# Patient Record
Sex: MALE | Race: WHITE | NOT HISPANIC OR LATINO | Employment: OTHER | URBAN - METROPOLITAN AREA
[De-identification: names, ages, dates, MRNs, and addresses within clinical notes are randomized per-mention and may not be internally consistent; named-entity substitution may affect disease eponyms.]

---

## 2017-06-30 ENCOUNTER — TRANSFERRED RECORDS (OUTPATIENT)
Dept: HEALTH INFORMATION MANAGEMENT | Facility: CLINIC | Age: 72
End: 2017-06-30

## 2017-08-07 ENCOUNTER — OFFICE VISIT (OUTPATIENT)
Dept: UROLOGY | Facility: CLINIC | Age: 72
End: 2017-08-07
Payer: COMMERCIAL

## 2017-08-07 VITALS
SYSTOLIC BLOOD PRESSURE: 128 MMHG | HEART RATE: 62 BPM | HEIGHT: 70 IN | DIASTOLIC BLOOD PRESSURE: 68 MMHG | WEIGHT: 175 LBS | BODY MASS INDEX: 25.05 KG/M2

## 2017-08-07 DIAGNOSIS — R97.20 ELEVATED PROSTATE SPECIFIC ANTIGEN (PSA): Primary | ICD-10-CM

## 2017-08-07 LAB
ALBUMIN UR-MCNC: NEGATIVE MG/DL
APPEARANCE UR: CLEAR
BILIRUB UR QL STRIP: NEGATIVE
COLOR UR AUTO: YELLOW
GLUCOSE UR STRIP-MCNC: NEGATIVE MG/DL
HGB UR QL STRIP: ABNORMAL
KETONES UR STRIP-MCNC: NEGATIVE MG/DL
LEUKOCYTE ESTERASE UR QL STRIP: NEGATIVE
NITRATE UR QL: NEGATIVE
PH UR STRIP: 7 PH (ref 5–7)
PSA SERPL-MCNC: 6.4 NG/ML (ref 0–4)
SP GR UR STRIP: 1.01 (ref 1–1.03)
URN SPEC COLLECT METH UR: ABNORMAL
UROBILINOGEN UR STRIP-ACNC: 0.2 EU/DL (ref 0.2–1)

## 2017-08-07 PROCEDURE — 99214 OFFICE O/P EST MOD 30 MIN: CPT | Performed by: UROLOGY

## 2017-08-07 PROCEDURE — 36415 COLL VENOUS BLD VENIPUNCTURE: CPT | Performed by: UROLOGY

## 2017-08-07 PROCEDURE — 81003 URINALYSIS AUTO W/O SCOPE: CPT | Performed by: UROLOGY

## 2017-08-07 PROCEDURE — 84153 ASSAY OF PSA TOTAL: CPT | Performed by: UROLOGY

## 2017-08-07 RX ORDER — ASPIRIN 81 MG/1
81 TABLET ORAL EVERY OTHER DAY
COMMUNITY
Start: 2015-03-03

## 2017-08-07 RX ORDER — EZETIMIBE 10 MG/1
10 TABLET ORAL DAILY
COMMUNITY
Start: 2017-01-03

## 2017-08-07 RX ORDER — NITROGLYCERIN 0.4 MG/1
0.4 TABLET SUBLINGUAL
COMMUNITY
Start: 2016-03-08

## 2017-08-07 RX ORDER — TRIAMCINOLONE ACETONIDE 55 UG/1
1 SPRAY, METERED NASAL DAILY PRN
COMMUNITY
Start: 2015-03-03

## 2017-08-07 RX ORDER — DIPHENOXYLATE HYDROCHLORIDE AND ATROPINE SULFATE 2.5; .025 MG/1; MG/1
1 TABLET ORAL DAILY
COMMUNITY

## 2017-08-07 RX ORDER — PITAVASTATIN CALCIUM 2.09 MG/1
2 TABLET, FILM COATED ORAL DAILY
COMMUNITY
Start: 2017-01-03

## 2017-08-07 RX ORDER — METOPROLOL TARTRATE 25 MG/1
12.5 TABLET, FILM COATED ORAL 2 TIMES DAILY
COMMUNITY
Start: 2017-01-03

## 2017-08-07 RX ORDER — PHENYTOIN SODIUM 100 MG/1
200 CAPSULE, EXTENDED RELEASE ORAL 2 TIMES DAILY
COMMUNITY

## 2017-08-07 RX ORDER — FAMOTIDINE 20 MG/1
20 TABLET, FILM COATED ORAL 2 TIMES DAILY
COMMUNITY
Start: 2011-11-22

## 2017-08-07 RX ORDER — PHENOL 1.4 %
600 AEROSOL, SPRAY (ML) MUCOUS MEMBRANE DAILY
COMMUNITY
Start: 2015-09-01

## 2017-08-07 RX ORDER — UBIDECARENONE 200 MG
200 CAPSULE ORAL DAILY
COMMUNITY
Start: 2016-03-08

## 2017-08-07 ASSESSMENT — PAIN SCALES - GENERAL: PAINLEVEL: NO PAIN (0)

## 2017-08-07 NOTE — MR AVS SNAPSHOT
"              After Visit Summary   8/7/2017    Kimani Juarez    MRN: [id number]           Patient Information     Date Of Birth          1945        Visit Information        Provider Department      8/7/2017 10:20 AM Kyle Wright MD [facility] [place]        Today's Diagnoses     Elevated prostate specific antigen (PSA)    -  1       Follow-ups after your visit        Follow-up notes from your care team     Return in about 1 year (around 8/7/2018) for PSA, Physical Exam, AUA score and bladder scan.      Who to contact     If you have questions or need follow up information about today's clinic visit or your schedule please contact [facility] [place] directly at [phone].  Normal or non-critical lab and imaging results will be communicated to you by Pharmaxishart, letter or phone within 4 business days after the clinic has received the results. If you do not hear from us within 7 days, please contact the clinic through Pharmaxishart or phone. If you have a critical or abnormal lab result, we will notify you by phone as soon as possible.  Submit refill requests through Escapia or call your pharmacy and they will forward the refill request to us. Please allow 3 business days for your refill to be completed.          Additional Information About Your Visit        PharmaxisharSocialinus Information     Escapia lets you send messages to your doctor, view your test results, renew your prescriptions, schedule appointments and more. To sign up, go to [email or web address] . Click on \"Log in\" on the left side of the screen, which will take you to the Welcome page. Then click on \"Sign up Now\" on the right side of the page.     You will be asked to enter the access code listed below, as well as some personal information. Please follow the directions to create your username and password.     Your access code is: [email or web address]  Expires: 11/5/2017 10:51 AM     Your access " "code will  in 90 days. If you need help or a new code, please call your [place] clinic or [phone].        Care EveryWhere ID     This is your Care EveryWhere ID. This could be used by other organizations to access your [facility] medical records  [id number]        Your Vitals Were     Pulse Height BMI (Body Mass Index)             62 1.778 m (5' 10\") 25.11 kg/m2          Blood Pressure from Last 3 Encounters:   17 128/68    Weight from Last 3 Encounters:   17 79.4 kg (175 lb)              We Performed the Following     PSA Diag Urologic Phys     UA without Microscopic        Primary Care Provider Office Phone # Fax #    Mike Monaco MD, [phone]       [facility] [address] [address]        Equal Access to Services     JOLIE JEFFRIES : Hadii aad ku hadasho Soomaali, waaxda luqadaha, qaybta kaalmada adeegyada, waxay idiin hayaan adebebeto kharakenia maldonadon . So [facility] [phone].    ATENCIÓN: Si habla español, tiene a egan disposición servicios gratuitos de asistencia lingüística. Nory al [phone].    We comply with applicable federal civil rights laws and [place] laws. We do not discriminate on the basis of race, color, national origin, age, disability sex, sexual orientation or gender identity.            Thank you!     Thank you for choosing [facility] [place]  for your care. Our goal is always to provide you with excellent care. Hearing back from our patients is one way we can continue to improve our services. Please take a few minutes to complete the written survey that you may receive in the mail after your visit with us. Thank you!             Your Updated Medication List - Protect others around you: Learn how to safely use, store and throw away your medicines at www.disposemymeds.org.          This list is accurate as of: 17 10:51 AM.  Always use your most recent med list.                   Brand Name " Dispense Instructions for use Diagnosis    Aspartame Powd      Start with 1 dose once daily.  Increase to 2 to 3 doses daily as tolerated.  For additional cholesterol lowering.        aspirin EC 81 MG EC tablet      Take 81 mg by mouth        calcium carbonate 600 MG tablet   Generic drug:  calcium carbonate      Take 1,500 mg by mouth        coenzyme Q-10 200 MG Caps      Take 1 capsule (200 mg) twice daily for muscle protection.        DILANTIN 100 MG CR capsule   Generic drug:  phenytoin           ezetimibe 10 MG tablet    ZETIA     Take 10 mg by mouth        famotidine 20 MG tablet    PEPCID     Take 20 mg by mouth        FIBER PO           metoprolol 25 MG tablet    LOPRESSOR     Take 12.5 mg by mouth        MULTI-VITAMINS Tabs           nitroGLYcerin 0.4 MG sublingual tablet    NITROSTAT     Place 0.4 mg under the tongue        pitavastatin 2 MG Tabs tablet     LIVALO     Take 2 mg by mouth        triamcinolone 55 MCG/ACT Inhaler    NASACORT     1 spray

## 2017-08-07 NOTE — LETTER
8/7/2017       RE: Kimani Juarez  [address]  [place] [address]     Dear Colleague,    Thank you for referring your patient, Kimani Juarez, to the [facility] [place] at [facility]. Please see a copy of my visit note below.    History: It is a great pleasure to see this very pleasant 72-year-old gentleman for the first time and just over 2 years.  He has a history of an elevated PSA, and biopsies of the prostate were done on one occasion which showed one biopsy showed a very small amount of atypia but no other remarkable features on any of the biopsies.  PSA at that time was 4.3  and subsequently PSA in March 2014 was 4.7, 6 months later it was 5.4.  I have not seen him for 2-1/2 years but today the PSA is 6.4.  This represents a slow PSA velocity and would be considered very close to the upper limit of normal for a man of his age.  He is voiding well although he does have some moderate nocturia is drinking a large amount of caffeine.  No evidence of any other significant urinary symptoms.  General health otherwise stable  Past Medical History:   Diagnosis Date     Mumps      Social History     Social History     Marital status:      Spouse name: N/A     Number of children: N/A     Years of education: N/A     Social History Main Topics     Smoking status: Never Smoker     Smokeless tobacco: Never Used     Alcohol use No     Drug use: No     Sexual activity: Not Asked     Other Topics Concern     None     Social History Narrative     None     Past Surgical History:   Procedure Laterality Date     CYSTOSCOPY       VASECTOMY       Family History   Problem Relation Age of Onset     HEART DISEASE Father      Current Outpatient Prescriptions:      aspirin EC 81 MG EC tablet, Take 81 mg by mouth, Disp: , Rfl:      calcium carbonate (CALCIUM CARBONATE) 600 MG tablet, Take 1,500 mg by mouth, Disp: , Rfl:      coenzyme Q-10 200 MG CAPS, Take  "1 capsule (200 mg) twice daily for muscle protection., Disp: , Rfl:      phenytoin (DILANTIN) 100 MG CR capsule, , Disp: , Rfl:      ezetimibe (ZETIA) 10 MG tablet, Take 10 mg by mouth, Disp: , Rfl:      famotidine (PEPCID) 20 MG tablet, Take 20 mg by mouth, Disp: , Rfl:      FIBER PO, , Disp: , Rfl:      metoprolol (LOPRESSOR) 25 MG tablet, Take 12.5 mg by mouth, Disp: , Rfl:      Multiple Vitamin (MULTI-VITAMINS) TABS, , Disp: , Rfl:      nitroGLYcerin (NITROSTAT) 0.4 MG sublingual tablet, Place 0.4 mg under the tongue, Disp: , Rfl:      pitavastatin ( LIVALO) 2 MG TABS tablet, Take 2 mg by mouth, Disp: , Rfl:      triamcinolone (NASACORT) 55 MCG/ACT Inhaler, 1 spray, Disp: , Rfl:      Aspartame POWD, Start with 1 dose once daily.  Increase to 2 to 3 doses daily as tolerated.  For additional cholesterol lowering., Disp: , Rfl:     10 point ROS of systems including Constitutional, Eyes, Respiratory, Cardiovascular, Gastroenterology, Genitourinary, Integumentary, Muscularskeletal, Psychiatric were all negative except for pertinent positives noted in my HPI.    Examination:   /68 (BP Location: Left arm, Patient Position: Sitting, Cuff Size: Adult Regular)  Pulse 62  Ht 1.778 m (5' 10\")  Wt 79.4 kg (175 lb)  BMI 25.11 kg/m2  General Impression: Very pleasant gentleman in no acute distress, well-oriented to time place and person  Mental Status: Normal.  HEENT.  There is no evidence of jaundice and the mucous membranes are normal  Skin: The skin is normal to examination  Respiratory System: The respiratory cycle is normal  Lymph Nodes: Not examined  Back/Flank Tenderness: There is no flank tenderness  Cardiovascular System: Not examined  Abdominal Examination: Unremarkable nonobese abdomen  Extremities: No significant peripheral edema  Genitial: Not examined  Rectal Examination: Good sphincter tone, normal perianal sensation.  Smooth rectal mucosa without hemorrhoids or fissures.  Smooth soft moderately " enlarged prostate without evidence of tenderness, bogginess or nodules  Neurologic System: There are no focal abnormal clinical neurological signs in the central, or peripheral nervous systems    Impression: I had a careful discussion with the patient and his wife about the situation.  The PSA velocity is very slow, the prostate gland is moderately enlarged but I do not feel any sinister areas within it.  He does have 1 previous biopsy with a small volume of atypia from 2013.  I had a careful discussion with him today about PSA and isvisiting, we also had a discussion about prostate cancer, its prevalence, its relationship between the incidence and mortality of prostate cancer and some of the controversy that surrounds his management.  He does not have a family history of prostate cancer.  We did have a discussion about whether we should consider a T3 MRI of the prostate; we have however decided to repeat the PSA in 1 year along with clinical examination.  At that point, if there is a significant increase in PSA velocity we will plan to do a T3 MRI; if the PSA is stable I will likely recommend he can follow up with his personal physician to have a PSA checked on an annual basis and then see me again should there be significant rise in the PSA.  We also had a discussion today about caffeine and its peritoneal defect on the bladder; he is drinking 10 or 12 cups of coffee a day!  However he does not seem to be bothered about getting up 2-3 times at night.  If this is however a bothersome issue to him I would recommend he reduce his caffeine intake  I did carefully discussed the entire situation with the patient and his wife today.  I answered all questions.    Plan: I will see him in one year for PSA, bladder scan, symptom score on examination    Time: 25 minutes.  Greater than 50% was spent in discussion, consultation with careful discussion about PSA and its nature and issues related to prostate cancer and also to  "caffeine and its effects on the bladder and the prostate    \"This dictation was performed with voice recognition software and may contain errors,  omissions and inadvertent word substitution.\"  Kyle Wright MD    "

## 2017-08-07 NOTE — PROGRESS NOTES
History: It is a great pleasure to see this very pleasant 72-year-old gentleman for the first time and just over 2 years.  He has a history of an elevated PSA, and biopsies of the prostate were done on one occasion which showed one biopsy showed a very small amount of atypia but no other remarkable features on any of the biopsies.  PSA at that time was 4.3  and subsequently PSA in March 2014 was 4.7, 6 months later it was 5.4.  I have not seen him for 2-1/2 years but today the PSA is 6.4.  This represents a slow PSA velocity and would be considered very close to the upper limit of normal for a man of his age.  He is voiding well although he does have some moderate nocturia is drinking a large amount of caffeine.  No evidence of any other significant urinary symptoms.  General health otherwise stable    Past Medical History:   Diagnosis Date     Mumps        Social History     Social History     Marital status:      Spouse name: N/A     Number of children: N/A     Years of education: N/A     Social History Main Topics     Smoking status: Never Smoker     Smokeless tobacco: Never Used     Alcohol use No     Drug use: No     Sexual activity: Not Asked     Other Topics Concern     None     Social History Narrative     None       Past Surgical History:   Procedure Laterality Date     CYSTOSCOPY       VASECTOMY         Family History   Problem Relation Age of Onset     HEART DISEASE Father          Current Outpatient Prescriptions:      aspirin EC 81 MG EC tablet, Take 81 mg by mouth, Disp: , Rfl:      calcium carbonate (CALCIUM CARBONATE) 600 MG tablet, Take 1,500 mg by mouth, Disp: , Rfl:      coenzyme Q-10 200 MG CAPS, Take 1 capsule (200 mg) twice daily for muscle protection., Disp: , Rfl:      phenytoin (DILANTIN) 100 MG CR capsule, , Disp: , Rfl:      ezetimibe (ZETIA) 10 MG tablet, Take 10 mg by mouth, Disp: , Rfl:      famotidine (PEPCID) 20 MG tablet, Take 20 mg by mouth, Disp: , Rfl:      FIBER PO, ,  "Disp: , Rfl:      metoprolol (LOPRESSOR) 25 MG tablet, Take 12.5 mg by mouth, Disp: , Rfl:      Multiple Vitamin (MULTI-VITAMINS) TABS, , Disp: , Rfl:      nitroGLYcerin (NITROSTAT) 0.4 MG sublingual tablet, Place 0.4 mg under the tongue, Disp: , Rfl:      pitavastatin ( LIVALO) 2 MG TABS tablet, Take 2 mg by mouth, Disp: , Rfl:      triamcinolone (NASACORT) 55 MCG/ACT Inhaler, 1 spray, Disp: , Rfl:      Aspartame POWD, Start with 1 dose once daily.  Increase to 2 to 3 doses daily as tolerated.  For additional cholesterol lowering., Disp: , Rfl:     10 point ROS of systems including Constitutional, Eyes, Respiratory, Cardiovascular, Gastroenterology, Genitourinary, Integumentary, Muscularskeletal, Psychiatric were all negative except for pertinent positives noted in my HPI.    Examination:   /68 (BP Location: Left arm, Patient Position: Sitting, Cuff Size: Adult Regular)  Pulse 62  Ht 1.778 m (5' 10\")  Wt 79.4 kg (175 lb)  BMI 25.11 kg/m2  General Impression: Very pleasant gentleman in no acute distress, well-oriented to time place and person  Mental Status: Normal.  HEENT.  There is no evidence of jaundice and the mucous membranes are normal  Skin: The skin is normal to examination  Respiratory System: The respiratory cycle is normal  Lymph Nodes: Not examined  Back/Flank Tenderness: There is no flank tenderness  Cardiovascular System: Not examined  Abdominal Examination: Unremarkable nonobese abdomen  Extremities: No significant peripheral edema  Genitial: Not examined  Rectal Examination: Good sphincter tone, normal perianal sensation.  Smooth rectal mucosa without hemorrhoids or fissures.  Smooth soft moderately enlarged prostate without evidence of tenderness, bogginess or nodules  Neurologic System: There are no focal abnormal clinical neurological signs in the central, or peripheral nervous systems    Impression: I had a careful discussion with the patient and his wife about the situation.  The PSA " "velocity is very slow, the prostate gland is moderately enlarged but I do not feel any sinister areas within it.  He does have 1 previous biopsy with a small volume of atypia from 2013.  I had a careful discussion with him today about PSA and isvisiting, we also had a discussion about prostate cancer, its prevalence, its relationship between the incidence and mortality of prostate cancer and some of the controversy that surrounds his management.  He does not have a family history of prostate cancer.  We did have a discussion about whether we should consider a T3 MRI of the prostate; we have however decided to repeat the PSA in 1 year along with clinical examination.  At that point, if there is a significant increase in PSA velocity we will plan to do a T3 MRI; if the PSA is stable I will likely recommend he can follow up with his personal physician to have a PSA checked on an annual basis and then see me again should there be significant rise in the PSA.  We also had a discussion today about caffeine and its peritoneal defect on the bladder; he is drinking 10 or 12 cups of coffee a day!  However he does not seem to be bothered about getting up 2-3 times at night.  If this is however a bothersome issue to him I would recommend he reduce his caffeine intake  I did carefully discussed the entire situation with the patient and his wife today.  I answered all questions.    Plan: I will see him in one year for PSA, bladder scan, symptom score on examination    Time: 25 minutes.  Greater than 50% was spent in discussion, consultation with careful discussion about PSA and its nature and issues related to prostate cancer and also to caffeine and its effects on the bladder and the prostate    \"This dictation was performed with voice recognition software and may contain errors,  omissions and inadvertent word substitution.\"      "

## 2018-08-03 DIAGNOSIS — R97.20 ELEVATED PROSTATE SPECIFIC ANTIGEN (PSA): Primary | ICD-10-CM

## 2018-08-03 PROCEDURE — 99214 OFFICE O/P EST MOD 30 MIN: CPT | Mod: 25 | Performed by: UROLOGY

## 2018-08-03 PROCEDURE — 51798 US URINE CAPACITY MEASURE: CPT | Performed by: UROLOGY

## 2018-08-09 ENCOUNTER — OFFICE VISIT (OUTPATIENT)
Dept: UROLOGY | Facility: CLINIC | Age: 73
End: 2018-08-09
Payer: COMMERCIAL

## 2018-08-09 VITALS
OXYGEN SATURATION: 98 % | HEIGHT: 70 IN | HEART RATE: 69 BPM | WEIGHT: 175 LBS | BODY MASS INDEX: 25.05 KG/M2 | DIASTOLIC BLOOD PRESSURE: 70 MMHG | SYSTOLIC BLOOD PRESSURE: 124 MMHG

## 2018-08-09 DIAGNOSIS — R97.20 ELEVATED PROSTATE SPECIFIC ANTIGEN (PSA): ICD-10-CM

## 2018-08-09 LAB — PSA SERPL-MCNC: 7.5 NG/ML (ref 0–4)

## 2018-08-09 PROCEDURE — 84153 ASSAY OF PSA TOTAL: CPT | Performed by: UROLOGY

## 2018-08-09 PROCEDURE — 99214 OFFICE O/P EST MOD 30 MIN: CPT | Performed by: UROLOGY

## 2018-08-09 PROCEDURE — 36415 COLL VENOUS BLD VENIPUNCTURE: CPT | Performed by: UROLOGY

## 2018-08-09 ASSESSMENT — PAIN SCALES - GENERAL: PAINLEVEL: NO PAIN (0)

## 2018-08-09 NOTE — LETTER
8/9/2018       RE: Berlin Cordoba  [address]  [place] [address]     Dear Colleague,    Thank you for referring your patient, Berlin Cordoba, to the [facility] [place] at [facility]. Please see a copy of my visit note below.    History: This is a great pleasure to see this very pleasant 73-year-old gentleman in follow-up consultation today.  He does have a history of an elevated PSA and in the past has had biopsies of the prostate which only showed a small amount of atypia without evidence of malignancy.  In addition he has been troubled by marked frequency and nocturia, with a symptom score today of 18.  Postvoid residual is only 17 cc.  We does drink a lot of caffeine, about 10 cups of coffee a day!  And occasionally also drinks sodas.  Results for BERLIN CORDOBA (MRN [id number]) as of 8/9/2018 14:12   Ref. Range 8/7/2017 10:01 8/9/2018 13:39   PSA Diag Urologic Phys Latest Ref Range: 0.00 - 4.00 ng/mL 6.40 (H) 7.50 (H)   There does seem to be a continuing slow rise in the PSA today it is 7.5 up from 6.4 last year.  He has not observed blood in the urine, there is no history of urinary tract infection.  His general health is otherwise stable  Past Medical History:   Diagnosis Date     Mumps        Social History     Social History     Marital status:      Spouse name: N/A     Number of children: N/A     Years of education: N/A     Social History Main Topics     Smoking status: Never Smoker     Smokeless tobacco: Never Used     Alcohol use No     Drug use: No     Sexual activity: Not Asked     Other Topics Concern     None     Social History Narrative       Past Surgical History:   Procedure Laterality Date     CYSTOSCOPY       VASECTOMY         Family History   Problem Relation Age of Onset     HEART DISEASE Father          Current Outpatient Prescriptions:      aspirin EC 81 MG EC tablet, Take 81 mg by mouth, Disp: , Rfl:       "calcium carbonate (CALCIUM CARBONATE) 600 MG tablet, Take 1,500 mg by mouth, Disp: , Rfl:      coenzyme Q-10 200 MG CAPS, Take 1 capsule (200 mg) twice daily for muscle protection., Disp: , Rfl:      ezetimibe (ZETIA) 10 MG tablet, Take 10 mg by mouth, Disp: , Rfl:      famotidine (PEPCID) 20 MG tablet, Take 20 mg by mouth, Disp: , Rfl:      FIBER PO, , Disp: , Rfl:      metoprolol (LOPRESSOR) 25 MG tablet, Take 12.5 mg by mouth, Disp: , Rfl:      Multiple Vitamin (MULTI-VITAMINS) TABS, , Disp: , Rfl:      phenytoin (DILANTIN) 100 MG CR capsule, , Disp: , Rfl:      pitavastatin ( LIVALO) 2 MG TABS tablet, Take 2 mg by mouth, Disp: , Rfl:      triamcinolone (NASACORT) 55 MCG/ACT Inhaler, 1 spray, Disp: , Rfl:      Aspartame POWD, Start with 1 dose once daily.  Increase to 2 to 3 doses daily as tolerated.  For additional cholesterol lowering., Disp: , Rfl:      nitroGLYcerin (NITROSTAT) 0.4 MG sublingual tablet, Place 0.4 mg under the tongue, Disp: , Rfl:     10 point ROS of systems including Constitutional, Eyes, Respiratory, Cardiovascular, Gastroenterology, Genitourinary, Integumentary, Muscularskeletal, Psychiatric were all negative except for pertinent positives noted in my HPI.    Examination:   /70 (BP Location: Left arm, Patient Position: Sitting, Cuff Size: Adult Regular)  Pulse 69  Ht 1.778 m (5' 10\")  Wt 79.4 kg (175 lb)  SpO2 98%  BMI 25.11 kg/m2  General Impression: Very pleasant gentleman in no acute distress, well oriented in time place and person  Mental Status: Normal.  HEENT.  There is no clinical evidence of jaundice and the mucous membranes are normal  Skin: Skin is normal to examination  Respiratory System: The respiratory cycle is normal  Lymph Nodes: Not examined  Back/Flank Tenderness: There is no flank tenderness  Cardiovascular System: Not examined  Abdominal Examination: Not examined  Extremities: There is no significant peripheral edema  Genitial: Not examined  Rectal " Examination: Good sphincter tone, normal perianal sensation.  Smooth rectal mucosa without hemorrhoids or fissures.  Smooth soft and significantly enlarged prostate with firm and rubbery consistency, without evidence of tenderness or bogginess.  Seminal vesicles.  Not palpable.  Perineum is otherwise normal to examination.    Neurologic System: There are no focal abnormal clinical neurological signs in the central, or peripheral nervous systems    Impression: There are 2 significant issues  1.  He is troubled by nocturia and frequency.  I think this is almost certainly attributable to high caffeine intake, I doubt there is much evidence of obstruction as the postvoid residual is only 17 cc and I recommended he try and eliminate caffeine in all forms to see if that is beneficial effect on his symptoms.  2.  The PSA has continued to slowly rise and is now 7.5 which is still elevated for amount of 71 the prostate gland itself does feel quite firm and rubbery but this in itself does not feel sinister.  However given this gradual rise in the PSA I think at this point we should arrange for a T3 MRI of the prostate for better evaluation and I told the patient that it is possible if suspicious areas are identified in the prostate we may need to consider him for another biopsy of the prostate that.  I did explain to him about prostate cancer, about the relevance of PSA which is an essentially very nonspecific test to prostate cancer, and some of the controversy surrounding the current management of prostate at the present time.  Most notably we discussed the relationship between the actual incidence of prostate cancer and his mortality and some of the different therapeutic options that may be considered should prostate cancer be identified including active surveillance, radical prostatectomy radiation therapy cryotherapy and hormonal treatment  I did discuss this procedure with him including the potential side effects which  "would include hematuria for a short time, blood in the stool for a short time, hematospermia for about a month and the possibility of sepsis.  I did carefully discuss the entire situation with the patient in detail today.      Plan: T3 MRI of the prostate and see me after  Caffeine free diet    Time: 25 minutes with greater than 50% spent in discussion and consultation due to number of significant urologic issues and discussions    \"This dictation was performed with voice recognition software and may contain errors,  omissions and inadvertent word substitution.\"    Again, thank you for allowing me to participate in the care of your patient.      Sincerely,    Kyle Wright MD      "

## 2018-08-09 NOTE — MR AVS SNAPSHOT
After Visit Summary   8/9/2018    Kimani Juarez    MRN: [id number]           Patient Information     Date Of Birth          1945        Visit Information        Provider Department      8/9/2018 1:40 PM Kyle Wright MD [facility] [place]        Today's Diagnoses     Elevated prostate specific antigen (PSA)           Follow-ups after your visit        Follow-up notes from your care team     Return for T3 MRI of prostate at , SEE ME AFTER.      Your next 10 appointments already scheduled     Aug 18, 2018  1:45 PM CDT   MR PROSTATE  WO & W CONTRAST with [id number]   [facility] ([facility])    [address] [address] [phone]           Take your medicines as usual, unless your doctor tells you not to. Bring a list of your current medicines to your exam (including vitamins, minerals and over-the-counter drugs).  You may or may not receive intravenous (IV) contrast for this exam pending the discretion of the Radiologist.  You do not need to do anything special to prepare.  The MRI machine uses a strong magnet. Please wear clothes without metal (snaps, zippers). A sweatsuit works well, or we may give you a hospital gown.  Please remove any body piercings and hair extensions before you arrive. You will also remove watches, jewelry, hairpins, wallets, dentures, partial dental plates and hearing aids. You may wear contact lenses, and you may be able to wear your rings. We have a safe place to keep your personal items, but it is safer to leave them at home.  **IMPORTANT** THE INSTRUCTIONS BELOW ARE ONLY FOR THOSE PATIENTS WHO HAVE BEEN PRESCRIBED SEDATION OR GENERAL ANESTHESIA DURING THEIR MRI PROCEDURE:  IF YOUR DOCTOR PRESCRIBED ORAL SEDATION (take medicine to help you relax during your exam):   You must get the medicine from your doctor (oral medication) before you arrive. Bring the  medicine to the exam. Do not take it at home. You ll be told when to take it upon arriving for your exam.   Arrive one hour early. Bring someone who can take you home after the test. Your medicine will make you sleepy. After the exam, you may not drive, take a bus or take a taxi by yourself.  IF YOUR DOCTOR PRESCRIBED IV SEDATION:   Arrive one hour early. Bring someone who can take you home after the test. Your medicine will make you sleepy. After the exam, you may not drive, take a bus or take a taxi by yourself.   No eating 6 hours before your exam. You may have clear liquids up until 4 hours before your exam. (Clear liquids include water, clear tea, black coffee and fruit juice without pulp.)  IF YOUR DOCTOR PRESCRIBED ANESTHESIA (be asleep for your exam):   Arrive 1 1/2 hours early. Bring someone who can take you home after the test. You may not drive, take a bus or take a taxi by yourself.   No eating 8 hours before your exam. You may have clear liquids up until 4 hours before your exam. (Clear liquids include water, clear tea, black coffee and fruit juice without pulp.)   You will spend four to five hours in the recovery room.  Please call the Imaging Department at your exam site with any questions.            Sep 10, 2018  1:00 PM CDT   Return Visit with Kyle Wright MD   [facility] ([facility])    [address]  [address]  [facility] [address]   [phone]              Future tests that were ordered for you today     Open Future Orders        Priority Expected Expires Ordered    MR Prostate wo & w Conrast Routine  8/9/2019 8/9/2018            Who to contact     If you have questions or need follow up information about today's clinic visit or your schedule please contact [facility] [place] directly at [phone].  Normal or non-critical lab and imaging results will be communicated to you by MyChart, letter  "or phone within 4 business days after the clinic has received the results. If you do not hear from us within 7 days, please contact the clinic through [employer] or phone. If you have a critical or abnormal lab result, we will notify you by phone as soon as possible.  Submit refill requests through [employer] or call your pharmacy and they will forward the refill request to us. Please allow 3 business days for your refill to be completed.          Additional Information About Your Visit        [employer] Information     [employer] lets you send messages to your doctor, view your test results, renew your prescriptions, schedule appointments and more. To sign up, go to [email or web address] . Click on \"Log in\" on the left side of the screen, which will take you to the Welcome page. Then click on \"Sign up Now\" on the right side of the page.     You will be asked to enter the access code listed below, as well as some personal information. Please follow the directions to create your username and password.     Your access code is: [email or web address]  Expires: 2018  2:34 PM     Your access code will  in 90 days. If you need help or a new code, please call your [facility] clinic or [phone].        Care EveryWhere ID     This is your Care EveryWhere ID. This could be used by other organizations to access your [facility] medical records  [id number]        Your Vitals Were     Pulse Height Pulse Oximetry BMI (Body Mass Index)          69 1.778 m (5' 10\") 98% 25.11 kg/m2         Blood Pressure from Last 3 Encounters:   18 124/70   17 128/68    Weight from Last 3 Encounters:   18 79.4 kg (175 lb)   17 79.4 kg (175 lb)              We Performed the Following     PSA Diag Urologic Phys        Primary Care Provider Office Phone # Fax #    Miek Monaco MD, [phone]       [facility] [address] [place] [address]        Equal Access to Services     NEY JEFFRIES AH: Hadii aad " flory Garcia, waaxda luqadaha, qaybta kaalmada negrito, crystal marlyin hayaan lowbebeto barbosa lanicolegalindo cruz. So [facility] [phone].    ATENCIÓN: Si habla suniañol, tiene a egan disposición servicios gratuitos de asistencia lingüística. Nory al [phone].    We comply with applicable federal civil rights laws and [place] laws. We do not discriminate on the basis of race, color, national origin, age, disability, sex, sexual orientation, or gender identity.            Thank you!     Thank you for choosing [facility] [place]  for your care. Our goal is always to provide you with excellent care. Hearing back from our patients is one way we can continue to improve our services. Please take a few minutes to complete the written survey that you may receive in the mail after your visit with us. Thank you!             Your Updated Medication List - Protect others around you: Learn how to safely use, store and throw away your medicines at www.disposemymeds.org.          This list is accurate as of 8/9/18  2:34 PM.  Always use your most recent med list.                   Brand Name Dispense Instructions for use Diagnosis    Aspartame Powd      Start with 1 dose once daily.  Increase to 2 to 3 doses daily as tolerated.  For additional cholesterol lowering.        aspirin 81 MG EC tablet      Take 81 mg by mouth        calcium carbonate 600 MG tablet   Generic drug:  calcium carbonate      Take 1,500 mg by mouth        coenzyme Q-10 200 MG Caps      Take 1 capsule (200 mg) twice daily for muscle protection.        DILANTIN 100 MG CR capsule   Generic drug:  phenytoin           ezetimibe 10 MG tablet    ZETIA     Take 10 mg by mouth        famotidine 20 MG tablet    PEPCID     Take 20 mg by mouth        FIBER PO           metoprolol tartrate 25 MG tablet    LOPRESSOR     Take 12.5 mg by mouth        MULTI-VITAMINS Tabs           nitroGLYcerin 0.4 MG sublingual tablet    NITROSTAT     Place 0.4 mg  under the tongue        pitavastatin 2 MG Tabs tablet     LIVALO     Take 2 mg by mouth        triamcinolone 55 MCG/ACT Inhaler    NASACORT     1 spray

## 2018-08-09 NOTE — PROGRESS NOTES
History: This is a great pleasure to see this very pleasant 73-year-old gentleman in follow-up consultation today.  He does have a history of an elevated PSA and in the past has had biopsies of the prostate which only showed a small amount of atypia without evidence of malignancy.  In addition he has been troubled by marked frequency and nocturia, with a symptom score today of 18.  Postvoid residual is only 17 cc.  We does drink a lot of caffeine, about 10 cups of coffee a day!  And occasionally also drinks sodas.  Results for BERLIN CORDOBA (MRN [id number]) as of 8/9/2018 14:12   Ref. Range 8/7/2017 10:01 8/9/2018 13:39   PSA Diag Urologic Phys Latest Ref Range: 0.00 - 4.00 ng/mL 6.40 (H) 7.50 (H)   There does seem to be a continuing slow rise in the PSA today it is 7.5 up from 6.4 last year.  He has not observed blood in the urine, there is no history of urinary tract infection.  His general health is otherwise stable  Past Medical History:   Diagnosis Date     Mumps        Social History     Social History     Marital status:      Spouse name: N/A     Number of children: N/A     Years of education: N/A     Social History Main Topics     Smoking status: Never Smoker     Smokeless tobacco: Never Used     Alcohol use No     Drug use: No     Sexual activity: Not Asked     Other Topics Concern     None     Social History Narrative       Past Surgical History:   Procedure Laterality Date     CYSTOSCOPY       VASECTOMY         Family History   Problem Relation Age of Onset     HEART DISEASE Father          Current Outpatient Prescriptions:      aspirin EC 81 MG EC tablet, Take 81 mg by mouth, Disp: , Rfl:      calcium carbonate (CALCIUM CARBONATE) 600 MG tablet, Take 1,500 mg by mouth, Disp: , Rfl:      coenzyme Q-10 200 MG CAPS, Take 1 capsule (200 mg) twice daily for muscle protection., Disp: , Rfl:      ezetimibe (ZETIA) 10 MG tablet, Take 10 mg by mouth, Disp: , Rfl:      famotidine (PEPCID) 20 MG tablet, Take  "20 mg by mouth, Disp: , Rfl:      FIBER PO, , Disp: , Rfl:      metoprolol (LOPRESSOR) 25 MG tablet, Take 12.5 mg by mouth, Disp: , Rfl:      Multiple Vitamin (MULTI-VITAMINS) TABS, , Disp: , Rfl:      phenytoin (DILANTIN) 100 MG CR capsule, , Disp: , Rfl:      pitavastatin ( LIVALO) 2 MG TABS tablet, Take 2 mg by mouth, Disp: , Rfl:      triamcinolone (NASACORT) 55 MCG/ACT Inhaler, 1 spray, Disp: , Rfl:      Aspartame POWD, Start with 1 dose once daily.  Increase to 2 to 3 doses daily as tolerated.  For additional cholesterol lowering., Disp: , Rfl:      nitroGLYcerin (NITROSTAT) 0.4 MG sublingual tablet, Place 0.4 mg under the tongue, Disp: , Rfl:     10 point ROS of systems including Constitutional, Eyes, Respiratory, Cardiovascular, Gastroenterology, Genitourinary, Integumentary, Muscularskeletal, Psychiatric were all negative except for pertinent positives noted in my HPI.    Examination:   /70 (BP Location: Left arm, Patient Position: Sitting, Cuff Size: Adult Regular)  Pulse 69  Ht 1.778 m (5' 10\")  Wt 79.4 kg (175 lb)  SpO2 98%  BMI 25.11 kg/m2  General Impression: Very pleasant gentleman in no acute distress, well oriented in time place and person  Mental Status: Normal.  HEENT.  There is no clinical evidence of jaundice and the mucous membranes are normal  Skin: Skin is normal to examination  Respiratory System: The respiratory cycle is normal  Lymph Nodes: Not examined  Back/Flank Tenderness: There is no flank tenderness  Cardiovascular System: Not examined  Abdominal Examination: Not examined  Extremities: There is no significant peripheral edema  Genitial: Not examined  Rectal Examination: Good sphincter tone, normal perianal sensation.  Smooth rectal mucosa without hemorrhoids or fissures.  Smooth soft and significantly enlarged prostate with firm and rubbery consistency, without evidence of tenderness or bogginess.  Seminal vesicles.  Not palpable.  Perineum is otherwise normal to " examination.    Neurologic System: There are no focal abnormal clinical neurological signs in the central, or peripheral nervous systems    Impression: There are 2 significant issues  1.  He is troubled by nocturia and frequency.  I think this is almost certainly attributable to high caffeine intake, I doubt there is much evidence of obstruction as the postvoid residual is only 17 cc and I recommended he try and eliminate caffeine in all forms to see if that is beneficial effect on his symptoms.  2.  The PSA has continued to slowly rise and is now 7.5 which is still elevated for amount of 73 the prostate gland itself does feel quite firm and rubbery but this in itself does not feel sinister.  However given this gradual rise in the PSA I think at this point we should arrange for a T3 MRI of the prostate for better evaluation and I told the patient that it is possible if suspicious areas are identified in the prostate we may need to consider him for another biopsy of the prostate that.  I did explain to him about prostate cancer, about the relevance of PSA which is an essentially very nonspecific test to prostate cancer, and some of the controversy surrounding the current management of prostate at the present time.  Most notably we discussed the relationship between the actual incidence of prostate cancer and his mortality and some of the different therapeutic options that may be considered should prostate cancer be identified including active surveillance, radical prostatectomy radiation therapy cryotherapy and hormonal treatment  I did discuss this procedure with him including the potential side effects which would include hematuria for a short time, blood in the stool for a short time, hematospermia for about a month and the possibility of sepsis.  I did carefully discuss the entire situation with the patient in detail today.      Plan: T3 MRI of the prostate and see me after  Caffeine free diet    Time: 25 minutes  "with greater than 50% spent in discussion and consultation due to number of significant urologic issues and discussions    \"This dictation was performed with voice recognition software and may contain errors,  omissions and inadvertent word substitution.\"      "

## 2018-08-18 ENCOUNTER — RADIANT APPOINTMENT (OUTPATIENT)
Dept: MRI IMAGING | Facility: CLINIC | Age: 73
End: 2018-08-18
Attending: UROLOGY
Payer: COMMERCIAL

## 2018-08-18 DIAGNOSIS — R97.20 ELEVATED PROSTATE SPECIFIC ANTIGEN (PSA): ICD-10-CM

## 2018-08-18 RX ORDER — GADOBUTROL 604.72 MG/ML
7.5 INJECTION INTRAVENOUS ONCE
Status: COMPLETED | OUTPATIENT
Start: 2018-08-18 | End: 2018-08-18

## 2018-08-18 RX ADMIN — GADOBUTROL 7.5 ML: 604.72 INJECTION INTRAVENOUS at 14:00

## 2018-08-18 NOTE — DISCHARGE INSTRUCTIONS

## 2019-01-29 DIAGNOSIS — R97.20 ELEVATED PROSTATE SPECIFIC ANTIGEN (PSA): Primary | ICD-10-CM

## 2019-02-04 ENCOUNTER — OFFICE VISIT (OUTPATIENT)
Dept: UROLOGY | Facility: CLINIC | Age: 74
End: 2019-02-04
Payer: COMMERCIAL

## 2019-02-04 VITALS
BODY MASS INDEX: 25.05 KG/M2 | SYSTOLIC BLOOD PRESSURE: 120 MMHG | OXYGEN SATURATION: 95 % | HEIGHT: 70 IN | WEIGHT: 175 LBS | DIASTOLIC BLOOD PRESSURE: 76 MMHG | HEART RATE: 68 BPM

## 2019-02-04 DIAGNOSIS — R97.20 ELEVATED PROSTATE SPECIFIC ANTIGEN (PSA): ICD-10-CM

## 2019-02-04 LAB — PSA SERPL-MCNC: 7.8 NG/ML (ref 0–4)

## 2019-02-04 PROCEDURE — 99214 OFFICE O/P EST MOD 30 MIN: CPT | Performed by: UROLOGY

## 2019-02-04 PROCEDURE — 84153 ASSAY OF PSA TOTAL: CPT | Performed by: UROLOGY

## 2019-02-04 PROCEDURE — 36415 COLL VENOUS BLD VENIPUNCTURE: CPT | Performed by: UROLOGY

## 2019-02-04 RX ORDER — CHLORAL HYDRATE 500 MG
1 CAPSULE ORAL DAILY
COMMUNITY

## 2019-02-04 ASSESSMENT — PAIN SCALES - GENERAL: PAINLEVEL: NO PAIN (0)

## 2019-02-04 ASSESSMENT — MIFFLIN-ST. JEOR: SCORE: 1555.04

## 2019-02-04 NOTE — PROGRESS NOTES
History: It is a great pleasure to see this very pleasant 73-year-old gentleman in follow-up consultation today.  As we recall, he has a history of an elevated PSA, and previously has had biopsies of the prostate which it showed only a small amount of atypia without evidence of malignancy.  He had previously been troubled by frequency of micturition and nocturia which is somewhat improved at this time.  This has come about because he has been advised to cut back on the large amount of caffeine that he was drinking.  Results for BERLIN CORDOBA (MRN [id number]) as of 2/4/2019 12:10   Ref. Range 8/7/2017 10:01 8/9/2018 13:39 8/18/2018 15:09 2/4/2019 10:28   PSA Diag Urologic Phys Latest Ref Range: 0.00 - 4.00 ng/mL 6.40 (H) 7.50 (H)  7.80 (H)   PSA has only risen very slightly from 7.5 up to 7.8 in the last 6 months  MRI PROSTATE:  8/18/2018 3:09 PM     CLINICAL HISTORY: Elevated prostate specific antigen (PSA)     Comparison: None available.     TECHNIQUE:      The following sequences were obtained: High-resolution axial  T2-weighted, coronal T2-weighted, 3D volumetric T2-weighted, axial  pre-contrast T1, axial diffusion-weighted, axial apparent diffusion  coefficient and axial dynamic contrast-enhanced T1. Postcontrast  images were evaluated on a separate workstation to evaluate dynamic  contrast enhancement.  Contrast dose: 7.5mL Gadavist     The images are interpreted according to PI-RADS v.2     FINDINGS: Patient motion degrades image quality on multiple sequences.     Prostate gland size: 4.4 x 5.6 x 5.4 cm  Volume: 69 cc     Peripheral zone: Hazy T2 hypointensity throughout the peripheral zone.  No focal area of asymmetric T2 hypointensity, projected effusion, or  early enhancement. Artifact from air in the rectum slightly limits  evaluation of the right posterior peripheral zone.       PI-RADS:  Peripheral zone T2: 2  Diffusion-weighted image: 2    Contrast-enhanced images: Negative       Overall assessment:  2     Transitional zone: There are BPH type changes without suspicious  lesion.     PI-RADS:  Transition zone T2: 2  Diffusion-weighted image: 2  Contrast-enhanced images: Negative       Overall assessment: 2     Remainder of the pelvis:  Prominent inguinal lymph nodes with  preserved fatty saida. In the left intertrochanteric line, there is an  8 mm enhancing lesion with mixed T2 signal (series series 17 image  43). In the right intertrochanteric line, there is a 5 mm enhancing  lesion with mixed T2 signal (series 17 image 44). Colonic  diverticulosis. Degenerative changes of the lower lumbar spine.                                                                      IMPRESSION:   1. Based on the most suspicious abnormality, this exam is  characterized as PIRADS 2 - Low probability.  Clinically significant  cancer is unlikely to be present.    2. Indeterminate bilateral enhancing foci at the intertrochanteric  lines with mixed T2 signal, measuring 8 and 5 mm on the left and  right, respectively. Appearance suggests benign etiology such as  enchondroma, or liposclerosing myxofibrous tumors. Consider follow-up  radiographs for further evaluation.  3. Patient motion degrades image quality on multiple sequences.     The images are interpreted according to PI-RADS v2.  http://www.acr.org/~/media/ACR/Documents/PDF/QualitySafety/Resources  PIRADS/[email or web address].pdf     I have personally reviewed the examination and initial interpretation  and I agree with the findings.     ELIAS IGLESIAS MD     In addition we performed a T3 MRI of the prostate 6 months ago which was very reassuring, showing only findings consistent with PIRADS 2, and a prostate volume of 69 cc.  His general health is otherwise stable he has no other major complaints at this time  Past Medical History:   Diagnosis Date     Mumps        Social History     Socioeconomic History     Marital status:      Spouse name: None     Number of children: None      Years of education: None     Highest education level: None   Social Needs     Financial resource strain: None     Food insecurity - worry: None     Food insecurity - inability: None     Transportation needs - medical: None     Transportation needs - non-medical: None   Occupational History     None   Tobacco Use     Smoking status: Never Smoker     Smokeless tobacco: Never Used   Substance and Sexual Activity     Alcohol use: No     Drug use: No     Sexual activity: None   Other Topics Concern     Parent/sibling w/ CABG, MI or angioplasty before 65F 55M? Not Asked   Social History Narrative     None       Past Surgical History:   Procedure Laterality Date     CYSTOSCOPY       VASECTOMY         Family History   Problem Relation Age of Onset     Heart Disease Father          Current Outpatient Medications:      aspirin EC 81 MG EC tablet, Take 81 mg by mouth, Disp: , Rfl:      calcium carbonate (CALCIUM CARBONATE) 600 MG tablet, Take 1,500 mg by mouth, Disp: , Rfl:      coenzyme Q-10 200 MG CAPS, Take 1 capsule (200 mg) twice daily for muscle protection., Disp: , Rfl:      ezetimibe (ZETIA) 10 MG tablet, Take 10 mg by mouth, Disp: , Rfl:      famotidine (PEPCID) 20 MG tablet, Take 20 mg by mouth, Disp: , Rfl:      FIBER PO, , Disp: , Rfl:      fish oil-omega-3 fatty acids 1000 MG capsule, Take 2 g by mouth daily, Disp: , Rfl:      metoprolol (LOPRESSOR) 25 MG tablet, Take 12.5 mg by mouth, Disp: , Rfl:      Multiple Vitamin (MULTI-VITAMINS) TABS, , Disp: , Rfl:      phenytoin (DILANTIN) 100 MG CR capsule, , Disp: , Rfl:      pitavastatin ( LIVALO) 2 MG TABS tablet, Take 2 mg by mouth, Disp: , Rfl:      triamcinolone (NASACORT) 55 MCG/ACT Inhaler, 1 spray, Disp: , Rfl:      Aspartame POWD, Start with 1 dose once daily.  Increase to 2 to 3 doses daily as tolerated.  For additional cholesterol lowering., Disp: , Rfl:      nitroGLYcerin (NITROSTAT) 0.4 MG sublingual tablet, Place 0.4 mg under the tongue, Disp: , Rfl:  "    10 point ROS of systems including Constitutional, Eyes, Respiratory, Cardiovascular, Gastroenterology, Genitourinary, Integumentary, Muscularskeletal, Psychiatric were all negative except for pertinent positives noted in my HPI.    Examination:   /76 (BP Location: Left arm, Patient Position: Sitting, Cuff Size: Adult Regular)   Pulse 68   Ht 1.778 m (5' 10\")   Wt 79.4 kg (175 lb)   SpO2 95%   BMI 25.11 kg/m    General Impression: Very pleasant gentleman in no acute distress, well oriented in time place and person  Mental Status: Normal.  HEENT.  There is no clinical evidence of jaundice in the mucous membranes are normal  Skin: Skin is otherwise normal to examination  Respiratory System: Respiratory cycle is normal  Lymph Nodes: Not examined  Back/Flank Tenderness: There is no flank tenderness  Cardiovascular System: There is no significant peripheral edema  Abdominal Examination: Not examined  Extremities: The extremities are otherwise unremarkable  Genitial: Not examined  Rectal Examination: Good sphincter tone, normal perianal sensation.  Smooth rectal mucosa without hemorrhoids or fissures.  Smooth soft and mildly enlarged prostate but without evidence of tenderness, bogginess or nodules.  Seminal vesicles.  Not palpable.  Perineum is otherwise normal to examination  Neurologic System: There are no focal abnormal clinical neurological signs in the central, or peripheral nervous systems    Impression: The PSA is only risen very slightly and the MRI scan 6 months ago was very reassuring.  At this time therefore I do not think we need to proceed with other investigation but will plan to repeat the PSA now in 1 year from now  Clearly if there is significant further uprising the PSA at that time we will need to consider whether we should repeat the MRI or even proceed to further biopsy of the prostate.  I did have a careful discussion with him today about PSA and his nonspecificity, and also a " "discussion about prostate cancer, some of the controversial issues related to the current management of prostate cancer, and a discussion to about the increasing significance of active surveillance for low-grade prostate cancer.  I did discuss the entire situation with the patient in detail today.  I answered all questions    Plan: 1 year for PSA and examination    Time: We did a careful review of old records today, going over his MRI scan in great detail as we had not actually seen him to discuss this since it was done, with careful discussions about the findings, and issues as noted above related to both PSA and prostate cancer    \"This dictation was performed with voice recognition software and may contain errors,  omissions and inadvertent word substitution.\"      "

## 2019-02-04 NOTE — NURSING NOTE
Chief Complaint   Patient presents with     Clinic Care Coordination - Follow-up     Pt here for same day PSA     Chikis Hopper, IGNACIO

## 2019-02-04 NOTE — LETTER
RE: Berlin Cordoba  [address] [facility] [address]     Dear Colleague,    Thank you for referring your patient, Berlin Cordoba, to the [facility] UROLOGY [facility] [place] at [facility]. Please see a copy of my visit note below.    History: It is a great pleasure to see this very pleasant 73-year-old gentleman in follow-up consultation today.  As we recall, he has a history of an elevated PSA, and previously has had biopsies of the prostate which it showed only a small amount of atypia without evidence of malignancy.  He had previously been troubled by frequency of micturition and nocturia which is somewhat improved at this time.  This has come about because he has been advised to cut back on the large amount of caffeine that he was drinking.  Results for BERLIN CORDOBA (MRN [id number]) as of 2/4/2019 12:10   Ref. Range 8/7/2017 10:01 8/9/2018 13:39 8/18/2018 15:09 2/4/2019 10:28   PSA Diag Urologic Phys Latest Ref Range: 0.00 - 4.00 ng/mL 6.40 (H) 7.50 (H)  7.80 (H)   PSA has only risen very slightly from 7.5 up to 7.8 in the last 6 months  MRI PROSTATE:  8/18/2018 3:09 PM     CLINICAL HISTORY: Elevated prostate specific antigen (PSA)     Comparison: None available.     TECHNIQUE:      The following sequences were obtained: High-resolution axial  T2-weighted, coronal T2-weighted, 3D volumetric T2-weighted, axial  pre-contrast T1, axial diffusion-weighted, axial apparent diffusion  coefficient and axial dynamic contrast-enhanced T1. Postcontrast  images were evaluated on a separate workstation to evaluate dynamic  contrast enhancement.  Contrast dose: 7.5mL Gadavist     The images are interpreted according to PI-RADS v.2     FINDINGS: Patient motion degrades image quality on multiple sequences.     Prostate gland size: 4.4 x 5.6 x 5.4 cm  Volume: 69 cc     Peripheral zone: Hazy T2 hypointensity throughout the peripheral zone.  No focal area of asymmetric  T2 hypointensity, projected effusion, or  early enhancement. Artifact from air in the rectum slightly limits  evaluation of the right posterior peripheral zone.       PI-RADS:  Peripheral zone T2: 2  Diffusion-weighted image: 2    Contrast-enhanced images: Negative       Overall assessment: 2     Transitional zone: There are BPH type changes without suspicious  lesion.     PI-RADS:  Transition zone T2: 2  Diffusion-weighted image: 2  Contrast-enhanced images: Negative       Overall assessment: 2     Remainder of the pelvis:  Prominent inguinal lymph nodes with  preserved fatty saida. In the left intertrochanteric line, there is an  8 mm enhancing lesion with mixed T2 signal (series series 17 image  43). In the right intertrochanteric line, there is a 5 mm enhancing  lesion with mixed T2 signal (series 17 image 44). Colonic  diverticulosis. Degenerative changes of the lower lumbar spine.                                                                      IMPRESSION:   1. Based on the most suspicious abnormality, this exam is  characterized as PIRADS 2 - Low probability.  Clinically significant  cancer is unlikely to be present.    2. Indeterminate bilateral enhancing foci at the intertrochanteric  lines with mixed T2 signal, measuring 8 and 5 mm on the left and  right, respectively. Appearance suggests benign etiology such as  enchondroma, or liposclerosing myxofibrous tumors. Consider follow-up  radiographs for further evaluation.  3. Patient motion degrades image quality on multiple sequences.     The images are interpreted according to PI-RADS v2.  [email or web address].[email or web address]~/[email or web address]  [email or web address]     I have personally reviewed the examination and initial interpretation  and I agree with the findings.     ELIAS IGLESIAS MD     In addition we performed a T3 MRI of the prostate 6 months ago which was very reassuring, showing only findings consistent with PIRADS 2, and a  prostate volume of 69 cc.  His general health is otherwise stable he has no other major complaints at this time  Past Medical History:   Diagnosis Date     Mumps        Social History     Socioeconomic History     Marital status:      Spouse name: None     Number of children: None     Years of education: None     Highest education level: None   Social Needs     Financial resource strain: None     Food insecurity - worry: None     Food insecurity - inability: None     Transportation needs - medical: None     Transportation needs - non-medical: None   Occupational History     None   Tobacco Use     Smoking status: Never Smoker     Smokeless tobacco: Never Used   Substance and Sexual Activity     Alcohol use: No     Drug use: No     Sexual activity: None   Other Topics Concern     Parent/sibling w/ CABG, MI or angioplasty before 65F 55M? Not Asked   Social History Narrative     None       Past Surgical History:   Procedure Laterality Date     CYSTOSCOPY       VASECTOMY         Family History   Problem Relation Age of Onset     Heart Disease Father          Current Outpatient Medications:      aspirin EC 81 MG EC tablet, Take 81 mg by mouth, Disp: , Rfl:      calcium carbonate (CALCIUM CARBONATE) 600 MG tablet, Take 1,500 mg by mouth, Disp: , Rfl:      coenzyme Q-10 200 MG CAPS, Take 1 capsule (200 mg) twice daily for muscle protection., Disp: , Rfl:      ezetimibe (ZETIA) 10 MG tablet, Take 10 mg by mouth, Disp: , Rfl:      famotidine (PEPCID) 20 MG tablet, Take 20 mg by mouth, Disp: , Rfl:      FIBER PO, , Disp: , Rfl:      fish oil-omega-3 fatty acids 1000 MG capsule, Take 2 g by mouth daily, Disp: , Rfl:      metoprolol (LOPRESSOR) 25 MG tablet, Take 12.5 mg by mouth, Disp: , Rfl:      Multiple Vitamin (MULTI-VITAMINS) TABS, , Disp: , Rfl:      phenytoin (DILANTIN) 100 MG CR capsule, , Disp: , Rfl:      pitavastatin ( LIVALO) 2 MG TABS tablet, Take 2 mg by mouth, Disp: , Rfl:      triamcinolone (NASACORT) 55  "MCG/ACT Inhaler, 1 spray, Disp: , Rfl:      Aspartame POWD, Start with 1 dose once daily.  Increase to 2 to 3 doses daily as tolerated.  For additional cholesterol lowering., Disp: , Rfl:      nitroGLYcerin (NITROSTAT) 0.4 MG sublingual tablet, Place 0.4 mg under the tongue, Disp: , Rfl:     Examination:   /76 (BP Location: Left arm, Patient Position: Sitting, Cuff Size: Adult Regular)   Pulse 68   Ht 1.778 m (5' 10\")   Wt 79.4 kg (175 lb)   SpO2 95%   BMI 25.11 kg/m     General Impression: Very pleasant gentleman in no acute distress, well oriented in time place and person  Mental Status: Normal.  HEENT.  There is no clinical evidence of jaundice in the mucous membranes are normal  Skin: Skin is otherwise normal to examination  Respiratory System: Respiratory cycle is normal  Lymph Nodes: Not examined  Back/Flank Tenderness: There is no flank tenderness  Cardiovascular System: There is no significant peripheral edema  Abdominal Examination: Not examined  Extremities: The extremities are otherwise unremarkable  Genitial: Not examined  Rectal Examination: Good sphincter tone, normal perianal sensation.  Smooth rectal mucosa without hemorrhoids or fissures.  Smooth soft and mildly enlarged prostate but without evidence of tenderness, bogginess or nodules.  Seminal vesicles.  Not palpable.  Perineum is otherwise normal to examination  Neurologic System: There are no focal abnormal clinical neurological signs in the central, or peripheral nervous systems    Impression: The PSA is only risen very slightly and the MRI scan 6 months ago was very reassuring.  At this time therefore I do not think we need to proceed with other investigation but will plan to repeat the PSA now in 1 year from now  Clearly if there is significant further uprising the PSA at that time we will need to consider whether we should repeat the MRI or even proceed to further biopsy of the prostate.  I did have a careful discussion with him " "today about PSA and his nonspecificity, and also a discussion about prostate cancer, some of the controversial issues related to the current management of prostate cancer, and a discussion to about the increasing significance of active surveillance for low-grade prostate cancer.  I did discuss the entire situation with the patient in detail today.  I answered all questions    Plan: 1 year for PSA and examination    Time: We did a careful review of old records today, going over his MRI scan in great detail as we had not actually seen him to discuss this since it was done, with careful discussions about the findings, and issues as noted above related to both PSA and prostate cancer    \"This dictation was performed with voice recognition software and may contain errors,  omissions and inadvertent word substitution.\"    Again, thank you for allowing me to participate in the care of your patient.      Sincerely,    Kyle Wright MD      "

## 2020-06-15 ENCOUNTER — TRANSFERRED RECORDS (OUTPATIENT)
Dept: HEALTH INFORMATION MANAGEMENT | Facility: CLINIC | Age: 75
End: 2020-06-15

## 2021-01-18 ENCOUNTER — OFFICE VISIT (OUTPATIENT)
Dept: UROLOGY | Facility: CLINIC | Age: 76
End: 2021-01-18
Payer: COMMERCIAL

## 2021-01-18 VITALS
BODY MASS INDEX: 25.05 KG/M2 | DIASTOLIC BLOOD PRESSURE: 80 MMHG | HEART RATE: 68 BPM | HEIGHT: 70 IN | WEIGHT: 175 LBS | SYSTOLIC BLOOD PRESSURE: 120 MMHG | OXYGEN SATURATION: 100 %

## 2021-01-18 DIAGNOSIS — Z87.898 HISTORY OF ELEVATED PSA: Primary | ICD-10-CM

## 2021-01-18 DIAGNOSIS — Z87.438 HISTORY OF ACUTE PROSTATITIS: ICD-10-CM

## 2021-01-18 PROCEDURE — 99214 OFFICE O/P EST MOD 30 MIN: CPT | Performed by: UROLOGY

## 2021-01-18 ASSESSMENT — MIFFLIN-ST. JEOR: SCORE: 1545.04

## 2021-01-18 ASSESSMENT — PAIN SCALES - GENERAL: PAINLEVEL: NO PAIN (0)

## 2021-01-18 NOTE — PROGRESS NOTES
History: It is a great pleasure to see this very pleasant 75-year-old gentleman in follow-up consultation today.  I have previously been seeing him in the past because of elevation of the PSA.  However about 3 weeks ago he developed severe pain in his lower extremities and a high fever and subsequently became septic.  His Covid test was negative.  He did have a positive urine culture for Morganella Morgagni and was transferred to intensive care unit from [facility].  He was intensive care unit for 4 days there with a diagnosis of acute prostatitis.  He is now completely recovered.  His PSA at the time of the infection was 129.  He is now voiding with very mild nocturia with a good stream with no evidence of gross hematuria.  In addition however he was also started on both Flomax and finasteride and also on warfarin because of atrial fibrillation.      Past Medical History:   Diagnosis Date     Mumps        Social History     Socioeconomic History     Marital status:      Spouse name: None     Number of children: None     Years of education: None     Highest education level: None   Occupational History     None   Social Needs     Financial resource strain: None     Food insecurity     Worry: None     Inability: None     Transportation needs     Medical: None     Non-medical: None   Tobacco Use     Smoking status: Never Smoker     Smokeless tobacco: Never Used   Substance and Sexual Activity     Alcohol use: No     Drug use: No     Sexual activity: None   Lifestyle     Physical activity     Days per week: None     Minutes per session: None     Stress: None   Relationships     Social connections     Talks on phone: None     Gets together: None     Attends [employer] service: None     Active member of club or organization: None     Attends meetings of clubs or organizations: None     Relationship status: None     Intimate partner violence     Fear of current or ex partner: None     Emotionally abused:  "None     Physically abused: None     Forced sexual activity: None   Other Topics Concern     Parent/sibling w/ CABG, MI or angioplasty before 65F 55M? Not Asked   Social History Narrative     None       Past Surgical History:   Procedure Laterality Date     CYSTOSCOPY       VASECTOMY         Family History   Problem Relation Age of Onset     Heart Disease Father          Current Outpatient Medications:      aspirin EC 81 MG EC tablet, Take 81 mg by mouth, Disp: , Rfl:      calcium carbonate (CALCIUM CARBONATE) 600 MG tablet, Take 1,500 mg by mouth, Disp: , Rfl:      coenzyme Q-10 200 MG CAPS, Take 1 capsule (200 mg) twice daily for muscle protection., Disp: , Rfl:      ezetimibe (ZETIA) 10 MG tablet, Take 10 mg by mouth, Disp: , Rfl:      famotidine (PEPCID) 20 MG tablet, Take 20 mg by mouth, Disp: , Rfl:      FIBER PO, , Disp: , Rfl:      fish oil-omega-3 fatty acids 1000 MG capsule, Take 2 g by mouth daily, Disp: , Rfl:      metoprolol (LOPRESSOR) 25 MG tablet, Take 12.5 mg by mouth, Disp: , Rfl:      Multiple Vitamin (MULTI-VITAMINS) TABS, , Disp: , Rfl:      phenytoin (DILANTIN) 100 MG CR capsule, , Disp: , Rfl:      pitavastatin ( LIVALO) 2 MG TABS tablet, Take 2 mg by mouth, Disp: , Rfl:      triamcinolone (NASACORT) 55 MCG/ACT Inhaler, 1 spray, Disp: , Rfl:      Aspartame POWD, Start with 1 dose once daily.  Increase to 2 to 3 doses daily as tolerated.  For additional cholesterol lowering., Disp: , Rfl:      nitroGLYcerin (NITROSTAT) 0.4 MG sublingual tablet, Place 0.4 mg under the tongue, Disp: , Rfl:     10 point ROS of systems including Constitutional, Eyes, Respiratory, Cardiovascular, Gastroenterology, Genitourinary, Integumentary, Muscularskeletal, Psychiatric and Neurologic were all negative except for pertinent positives noted in my HPI.    Examination:   /80   Pulse 68   Ht 1.778 m (5' 10\")   Wt 79.4 kg (175 lb)   SpO2 100%   BMI 25.11 kg/m    General Impression: Very pleasant patient in " "no acute distress, well-oriented in time place and person and quite conversational  Mental Status: Normal  HEENT: Extraocular movements intact.  No clinical evidence of jaundice on examination of eyes.  Mucous membranes are unremarkable  Skin: Warm.  No other abnormalities  Respiratory System: Unlabored on room air.  Respiratory cycle normal  Lymph Nodes: Not examined  Back/Flank Tenderness: There is no flank tenderness  Cardiovascular System: No significant peripheral pitting edema  Abdominal Examination: Not examined  Extremities: Extremities otherwise unremarkable  Genitial: Not examined  Rectal Examination: Good sphincter tone, normal perianal sensation.  Smooth rectal mucosa without hemorrhoids or fissures.  Smooth very slightly tender normal feeling prostate which is mildly enlarged without evidence, bogginess or nodules.  Seminal vesicles.  Not palpable.  Perineum is otherwise normal to examination  Neurologic System: There are no significant acute abnormal neurological signs in the central or peripheral nervous systems    Impression: The patient has had an episode of acute prostatitis which is responded to treatment.  The PSA has very greatly risen to 129 but given the fact that the prostate does not feel sinister this is almost certainly due to acute acute prostatitis.  At this point I would render recommend that we repeat the PSA in 3 months with a bladder scan and symptom score and reassess whether he needs to continue both finasteride and Flomax at that time as he had very few voiding symptoms prior to this recent septic episode.  I have reviewed the records both from here and from outside in detail.  I went over all pertinent labs and other studies.  I answered all his questions    Plan: 3 months for PSA, bladder scan, symptom score and examination    Time: 25 minutes.  Greater than 50 spent in discussion and consultation    \"This dictation was performed with voice recognition software and may contain " "errors,  omissions and inadvertent word substitution.\"      "

## 2021-01-18 NOTE — LETTER
1/18/2021       RE: Kimani Juarez  [address]  [facility] [address]     Dear Colleague,    Thank you for referring your patient, Kimani Juarez, to the [facility] UROLOGY CLINIC [place] at [facility]. Please see a copy of my visit note below.    History: It is a great pleasure to see this very pleasant 75-year-old gentleman in follow-up consultation today.  I have previously been seeing him in the past because of elevation of the PSA.  However about 3 weeks ago he developed severe pain in his lower extremities and a high fever and subsequently became septic.  His Covid test was negative.  He did have a positive urine culture for Morganella Morgagni and was transferred to intensive care unit from [place] to [place].  He was intensive care unit for 4 days there with a diagnosis of acute prostatitis.  He is now completely recovered.  His PSA at the time of the infection was 129.  He is now voiding with very mild nocturia with a good stream with no evidence of gross hematuria.  In addition however he was also started on both Flomax and finasteride and also on warfarin because of atrial fibrillation.      Past Medical History:   Diagnosis Date     Mumps        Social History     Socioeconomic History     Marital status:      Spouse name: None     Number of children: None     Years of education: None     Highest education level: None   Occupational History     None   Social Needs     Financial resource strain: None     Food insecurity     Worry: None     Inability: None     Transportation needs     Medical: None     Non-medical: None   Tobacco Use     Smoking status: Never Smoker     Smokeless tobacco: Never Used   Substance and Sexual Activity     Alcohol use: No     Drug use: No     Sexual activity: None   Lifestyle     Physical activity     Days per week: None     Minutes per session: None     Stress: None   Relationships     Social connections     Talks on  phone: None     Gets together: None     Attends Buddhist service: None     Active member of club or organization: None     Attends meetings of clubs or organizations: None     Relationship status: None     Intimate partner violence     Fear of current or ex partner: None     Emotionally abused: None     Physically abused: None     Forced sexual activity: None   Other Topics Concern     Parent/sibling w/ CABG, MI or angioplasty before 65F 55M? Not Asked   Social History Narrative     None       Past Surgical History:   Procedure Laterality Date     CYSTOSCOPY       VASECTOMY         Family History   Problem Relation Age of Onset     Heart Disease Father          Current Outpatient Medications:      aspirin EC 81 MG EC tablet, Take 81 mg by mouth, Disp: , Rfl:      calcium carbonate (CALCIUM CARBONATE) 600 MG tablet, Take 1,500 mg by mouth, Disp: , Rfl:      coenzyme Q-10 200 MG CAPS, Take 1 capsule (200 mg) twice daily for muscle protection., Disp: , Rfl:      ezetimibe (ZETIA) 10 MG tablet, Take 10 mg by mouth, Disp: , Rfl:      famotidine (PEPCID) 20 MG tablet, Take 20 mg by mouth, Disp: , Rfl:      FIBER PO, , Disp: , Rfl:      fish oil-omega-3 fatty acids 1000 MG capsule, Take 2 g by mouth daily, Disp: , Rfl:      metoprolol (LOPRESSOR) 25 MG tablet, Take 12.5 mg by mouth, Disp: , Rfl:      Multiple Vitamin (MULTI-VITAMINS) TABS, , Disp: , Rfl:      phenytoin (DILANTIN) 100 MG CR capsule, , Disp: , Rfl:      pitavastatin ( LIVALO) 2 MG TABS tablet, Take 2 mg by mouth, Disp: , Rfl:      triamcinolone (NASACORT) 55 MCG/ACT Inhaler, 1 spray, Disp: , Rfl:      Aspartame POWD, Start with 1 dose once daily.  Increase to 2 to 3 doses daily as tolerated.  For additional cholesterol lowering., Disp: , Rfl:      nitroGLYcerin (NITROSTAT) 0.4 MG sublingual tablet, Place 0.4 mg under the tongue, Disp: , Rfl:     10 point ROS of systems including Constitutional, Eyes, Respiratory, Cardiovascular, Gastroenterology,  "Genitourinary, Integumentary, Muscularskeletal, Psychiatric and Neurologic were all negative except for pertinent positives noted in my HPI.    Examination:   /80   Pulse 68   Ht 1.778 m (5' 10\")   Wt 79.4 kg (175 lb)   SpO2 100%   BMI 25.11 kg/m    General Impression: Very pleasant patient in no acute distress, well-oriented in time place and person and quite conversational  Mental Status: Normal  HEENT: Extraocular movements intact.  No clinical evidence of jaundice on examination of eyes.  Mucous membranes are unremarkable  Skin: Warm.  No other abnormalities  Respiratory System: Unlabored on room air.  Respiratory cycle normal  Lymph Nodes: Not examined  Back/Flank Tenderness: There is no flank tenderness  Cardiovascular System: No significant peripheral pitting edema  Abdominal Examination: Not examined  Extremities: Extremities otherwise unremarkable  Genitial: Not examined  Rectal Examination: Good sphincter tone, normal perianal sensation.  Smooth rectal mucosa without hemorrhoids or fissures.  Smooth very slightly tender normal feeling prostate which is mildly enlarged without evidence, bogginess or nodules.  Seminal vesicles.  Not palpable.  Perineum is otherwise normal to examination  Neurologic System: There are no significant acute abnormal neurological signs in the central or peripheral nervous systems    Impression: The patient has had an episode of acute prostatitis which is responded to treatment.  The PSA has very greatly risen to 129 but given the fact that the prostate does not feel sinister this is almost certainly due to acute acute prostatitis.  At this point I would render recommend that we repeat the PSA in 3 months with a bladder scan and symptom score and reassess whether he needs to continue both finasteride and Flomax at that time as he had very few voiding symptoms prior to this recent septic episode.  I have reviewed the records both from here and from outside in detail.  I " "went over all pertinent labs and other studies.  I answered all his questions    Plan: 3 months for PSA, bladder scan, symptom score and examination    Time: 25 minutes.  Greater than 50 spent in discussion and consultation    \"This dictation was performed with voice recognition software and may contain errors,  omissions and inadvertent word substitution.\"      Sincerely,    Kyle Wright MD      "

## 2021-03-31 ENCOUNTER — TELEPHONE (OUTPATIENT)
Dept: UROLOGY | Facility: CLINIC | Age: 76
End: 2021-03-31

## 2021-03-31 DIAGNOSIS — Z87.898 HISTORY OF ELEVATED PSA: Primary | ICD-10-CM

## 2021-03-31 RX ORDER — FINASTERIDE 5 MG/1
1 TABLET, FILM COATED ORAL DAILY
Qty: 90 TABLET | Refills: 0 | Status: SHIPPED | OUTPATIENT
Start: 2021-03-31 | End: 2021-06-25

## 2021-03-31 RX ORDER — TAMSULOSIN HYDROCHLORIDE 0.4 MG/1
0.4 CAPSULE ORAL DAILY
Qty: 90 CAPSULE | Refills: 0 | Status: SHIPPED | OUTPATIENT
Start: 2021-03-31 | End: 2021-06-25

## 2021-03-31 RX ORDER — FINASTERIDE 5 MG/1
1 TABLET, FILM COATED ORAL DAILY
COMMUNITY
Start: 2021-01-04 | End: 2021-03-31

## 2021-03-31 RX ORDER — TAMSULOSIN HYDROCHLORIDE 0.4 MG/1
0.4 CAPSULE ORAL
COMMUNITY
Start: 2021-01-05 | End: 2021-03-31

## 2021-03-31 NOTE — TELEPHONE ENCOUNTER
[facility] Call Center    Phone Message    May a detailed message be left on voicemail: yes     Reason for Call: Medication Refill Request    Has the patient contacted the pharmacy for the refill? Yes   Name of medication being requested: Finasteride 5mg & Tamsulosin 0.4mg  Provider who prescribed the medication: Dr. Kyle Wright  Pharmacy: [facility] - [address] [place] [id number] [phone]  Date medication is needed: ASAP         Action Taken: Message routed to:  Other: UA [facility]ro    Travel Screening: Not Applicable

## 2021-06-21 DIAGNOSIS — R97.20 ELEVATED PROSTATE SPECIFIC ANTIGEN (PSA): Primary | ICD-10-CM

## 2021-06-25 ENCOUNTER — OFFICE VISIT (OUTPATIENT)
Dept: UROLOGY | Facility: CLINIC | Age: 76
End: 2021-06-25
Payer: COMMERCIAL

## 2021-06-25 VITALS
BODY MASS INDEX: 25.05 KG/M2 | WEIGHT: 175 LBS | HEART RATE: 62 BPM | HEIGHT: 70 IN | OXYGEN SATURATION: 96 % | SYSTOLIC BLOOD PRESSURE: 120 MMHG | DIASTOLIC BLOOD PRESSURE: 80 MMHG

## 2021-06-25 DIAGNOSIS — N40.1 BPH WITH URINARY OBSTRUCTION: ICD-10-CM

## 2021-06-25 DIAGNOSIS — R97.20 ELEVATED PROSTATE SPECIFIC ANTIGEN (PSA): Primary | ICD-10-CM

## 2021-06-25 DIAGNOSIS — Z87.898 HISTORY OF ELEVATED PSA: ICD-10-CM

## 2021-06-25 DIAGNOSIS — N13.8 BPH WITH URINARY OBSTRUCTION: ICD-10-CM

## 2021-06-25 LAB
ALBUMIN UR-MCNC: NEGATIVE MG/DL
APPEARANCE UR: CLEAR
BILIRUB UR QL STRIP: NEGATIVE
COLOR UR AUTO: YELLOW
GLUCOSE UR STRIP-MCNC: NEGATIVE MG/DL
HGB UR QL STRIP: NEGATIVE
KETONES UR STRIP-MCNC: NEGATIVE MG/DL
LEUKOCYTE ESTERASE UR QL STRIP: NEGATIVE
NITRATE UR QL: NEGATIVE
PH UR STRIP: 7.5 PH (ref 5–7)
PSA SERPL-MCNC: 9.4 NG/ML (ref 0–4)
RESIDUAL VOLUME (RV) (EXTERNAL): 30
SOURCE: ABNORMAL
SP GR UR STRIP: 1.02 (ref 1–1.03)
UROBILINOGEN UR STRIP-ACNC: 0.2 EU/DL (ref 0.2–1)

## 2021-06-25 PROCEDURE — 81003 URINALYSIS AUTO W/O SCOPE: CPT | Performed by: UROLOGY

## 2021-06-25 PROCEDURE — 99214 OFFICE O/P EST MOD 30 MIN: CPT | Mod: 25 | Performed by: UROLOGY

## 2021-06-25 PROCEDURE — 51798 US URINE CAPACITY MEASURE: CPT | Performed by: UROLOGY

## 2021-06-25 PROCEDURE — 84153 ASSAY OF PSA TOTAL: CPT | Performed by: UROLOGY

## 2021-06-25 RX ORDER — TAMSULOSIN HYDROCHLORIDE 0.4 MG/1
0.4 CAPSULE ORAL DAILY
Qty: 90 CAPSULE | Refills: 3 | Status: ON HOLD | OUTPATIENT
Start: 2021-06-25 | End: 2021-10-09

## 2021-06-25 RX ORDER — LEVOFLOXACIN 500 MG/1
TABLET, FILM COATED ORAL
COMMUNITY
Start: 2021-01-04 | End: 2021-10-08

## 2021-06-25 RX ORDER — CEPHALEXIN 250 MG
CAPSULE ORAL
COMMUNITY
End: 2021-10-08

## 2021-06-25 RX ORDER — FINASTERIDE 5 MG/1
1 TABLET, FILM COATED ORAL DAILY
Qty: 90 TABLET | Refills: 3 | Status: SHIPPED | OUTPATIENT
Start: 2021-06-25 | End: 2021-11-18

## 2021-06-25 RX ORDER — WARFARIN SODIUM 2.5 MG/1
2.5 TABLET ORAL
COMMUNITY
Start: 2021-01-04 | End: 2021-10-08

## 2021-06-25 ASSESSMENT — PAIN SCALES - GENERAL: PAINLEVEL: NO PAIN (0)

## 2021-06-25 ASSESSMENT — MIFFLIN-ST. JEOR: SCORE: 1540.04

## 2021-06-25 NOTE — NURSING NOTE
Chief Complaint   Patient presents with     Elevated PSA     AUA, PVR , UA     PVR scan was 30ml today    Rebeka Castaneda

## 2021-06-26 PROBLEM — N13.8 BPH WITH URINARY OBSTRUCTION: Status: ACTIVE | Noted: 2021-06-26

## 2021-06-26 PROBLEM — Q23.81 BICUSPID AORTIC VALVE: Status: ACTIVE | Noted: 2021-01-02

## 2021-06-26 PROBLEM — N40.1 BPH WITH URINARY OBSTRUCTION: Status: ACTIVE | Noted: 2021-06-26

## 2021-06-26 PROBLEM — G40.909 SEIZURE DISORDER (H): Status: ACTIVE | Noted: 2021-01-02

## 2021-06-26 PROBLEM — I70.90 ARTERIOSCLEROTIC VASCULAR DISEASE: Status: ACTIVE | Noted: 2021-01-02

## 2021-06-26 PROBLEM — R97.20 ELEVATED PROSTATE SPECIFIC ANTIGEN (PSA): Status: ACTIVE | Noted: 2021-06-26

## 2021-08-14 ENCOUNTER — ANCILLARY PROCEDURE (OUTPATIENT)
Dept: MRI IMAGING | Facility: CLINIC | Age: 76
End: 2021-08-14
Attending: UROLOGY
Payer: COMMERCIAL

## 2021-08-14 DIAGNOSIS — R97.20 ELEVATED PROSTATE SPECIFIC ANTIGEN (PSA): ICD-10-CM

## 2021-08-14 PROCEDURE — A9585 GADOBUTROL INJECTION: HCPCS | Performed by: RADIOLOGY

## 2021-08-14 PROCEDURE — 72197 MRI PELVIS W/O & W/DYE: CPT | Performed by: RADIOLOGY

## 2021-08-14 RX ORDER — GADOBUTROL 604.72 MG/ML
7.5 INJECTION INTRAVENOUS ONCE
Status: COMPLETED | OUTPATIENT
Start: 2021-08-14 | End: 2021-08-14

## 2021-08-14 RX ADMIN — GADOBUTROL 7.5 ML: 604.72 INJECTION INTRAVENOUS at 09:46

## 2021-08-23 DIAGNOSIS — Z87.898 HISTORY OF ELEVATED PSA: Primary | ICD-10-CM

## 2021-08-27 ENCOUNTER — OFFICE VISIT (OUTPATIENT)
Dept: UROLOGY | Facility: CLINIC | Age: 76
End: 2021-08-27
Payer: COMMERCIAL

## 2021-08-27 VITALS
SYSTOLIC BLOOD PRESSURE: 120 MMHG | HEIGHT: 70 IN | OXYGEN SATURATION: 96 % | DIASTOLIC BLOOD PRESSURE: 70 MMHG | BODY MASS INDEX: 25.05 KG/M2 | WEIGHT: 175 LBS | HEART RATE: 82 BPM

## 2021-08-27 DIAGNOSIS — N40.1 BPH WITH URINARY OBSTRUCTION: Primary | ICD-10-CM

## 2021-08-27 DIAGNOSIS — N13.8 BPH WITH URINARY OBSTRUCTION: Primary | ICD-10-CM

## 2021-08-27 DIAGNOSIS — R97.20 ELEVATED PROSTATE SPECIFIC ANTIGEN (PSA): ICD-10-CM

## 2021-08-27 DIAGNOSIS — Z87.898 HISTORY OF ELEVATED PSA: ICD-10-CM

## 2021-08-27 LAB
ALBUMIN UR-MCNC: NEGATIVE MG/DL
APPEARANCE UR: CLEAR
BILIRUB UR QL STRIP: NEGATIVE
COLOR UR AUTO: YELLOW
GLUCOSE UR STRIP-MCNC: NEGATIVE MG/DL
HGB UR QL STRIP: NEGATIVE
KETONES UR STRIP-MCNC: ABNORMAL MG/DL
LEUKOCYTE ESTERASE UR QL STRIP: NEGATIVE
NITRATE UR QL: NEGATIVE
PH UR STRIP: 7 [PH] (ref 5–7)
PSA SERPL-MCNC: 6.6 UG/L (ref 0–4)
SP GR UR STRIP: 1.02 (ref 1–1.03)
UROBILINOGEN UR STRIP-ACNC: 0.2 E.U./DL

## 2021-08-27 PROCEDURE — 99214 OFFICE O/P EST MOD 30 MIN: CPT | Mod: 25 | Performed by: UROLOGY

## 2021-08-27 PROCEDURE — 52000 CYSTOURETHROSCOPY: CPT | Performed by: UROLOGY

## 2021-08-27 PROCEDURE — 36415 COLL VENOUS BLD VENIPUNCTURE: CPT | Performed by: UROLOGY

## 2021-08-27 PROCEDURE — 81003 URINALYSIS AUTO W/O SCOPE: CPT | Mod: QW | Performed by: UROLOGY

## 2021-08-27 PROCEDURE — 84153 ASSAY OF PSA TOTAL: CPT | Performed by: UROLOGY

## 2021-08-27 RX ORDER — LIDOCAINE HYDROCHLORIDE 20 MG/ML
JELLY TOPICAL ONCE
Status: DISCONTINUED | OUTPATIENT
Start: 2021-08-27 | End: 2021-08-28 | Stop reason: HOSPADM

## 2021-08-27 ASSESSMENT — PAIN SCALES - GENERAL: PAINLEVEL: NO PAIN (0)

## 2021-08-27 ASSESSMENT — MIFFLIN-ST. JEOR: SCORE: 1540.04

## 2021-08-27 NOTE — PROGRESS NOTES
"  CHIEF COMPLAINT   It was my pleasure to see Kimani Juarez who is a 76 year old male for follow-up of BPH with urinary obstruction and elevated PSA.      HPI   Kimani Juarez is a very pleasant 76 year old male who presents with a history of Elevated PSA. He has previously been followed by Dr. Wright. Had PSA rise in the past to 7.8. Had MRI done 2018 demonstrating PIRADS 2 lesion and elected observation. More recently, had episode of acute prostatitis with PSA flare to 129 at that time.   No ongoing symptoms of prostatitis, but does have significant BPH with obstructive urinary symptoms. Takes finasteride and tamsulosin at baseline. Remains bothered by his weak stream and difficulty emptying. Is interested in possible bladder outlet procedure.     PSA recheck now down to 6.6. He also had prostate MRI which was reviewed today and demonstrated PIRADS 2 lesion.     PSA   8/27/2021 - 6.6  6/25/2021 - 9.40  1/2/2021 - 129 (prostatitis)  2/4/2019 - 7.80  8/9/2018 - 7.50  8/7/2017 - 6.40     MRI Prostate 8/14/2021  Size: 76 grams  IMPRESSION:  1. Based on the most suspicious abnormality, this exam is  characterized as PIRADS 2 - Clinically significant cancer is unlikely  to be present.  2. No suspicious adenopathy or evidence of pelvic metastases.    PHYSICAL EXAM  Patient is a 76 year old  male   Vitals: Blood pressure 120/70, pulse 82, height 1.778 m (5' 10\"), weight 79.4 kg (175 lb), SpO2 96 %.  General Appearance Adult: Body mass index is 25.11 kg/m .  Alert, no acute distress, oriented  Lungs: no respiratory distress, or pursed lip breathing  Abdomen: soft, nontender, no organomegaly or masses  Back: no CVAT  Neuro: Alert, oriented, speech and mentation normal  Psych: affect and mood normal  : penis, scrotum, testes normal    OFFICE CYSTOSCOPY 8/27/2021    Pre-procedure diagnosis:  BPH with urinary obstruction  Post-procedure diagnosis: BPH with significant lateral lobe obstruction  Procedure performed: "  Cystourethroscopy  Surgeon:    Marky Jarrett MD  Anesthesia:    Local    Description of procedure:   After fully informed, voluntary consent was obtained, the patient was brought into the procedure room, identified and placed in a supine position on the cystoscopy table.  The groin/scrotum were prepped with betadine and draped in a sterile fashion. Urojet lidocaine gel was introduced.  A 15F flexible cystoscope was inserted into the urethra, and the bladder and urethra were examined in a systematic manner.  The patient tolerated the procedure well and there were no complications.      Cystoscopic findings:  The urethra was normal without strictures.  The prostate was 4cm long and demonstrated significant bilobar hypertrophy.  There was moderate median lobe.  The external sphincter coapted well and the bladder neck was obstructed. The bladder was completely surveyed.  There was moderate trabeculation.  There were no neoplasms, stones, or diverticula identifed.  The ureteric orifices were normal in position and number and effluxing clear urine.    Outside and Past Medical records:  Review of the result(s) of each unique test - PSA, MRI, urinalysis    ASSESSMENT and PLAN  76 year old male with history of elevated PSA and BPH with obstructive voiding symptoms. Regarding his PSA, this has chronically been elevated and now down to 6.6, which is lower than it's been over the past several years. He also has only PIRADS 2 on his MRI. As such, it is reasonable to defer prostate biopsy at this time.    Regarding his voiding symptoms, cystoscopy today demonstrated large prostate with obstructing lateral lobe hypertrophy. He remains bothered by his urinary symptoms including weak stream and nocturia, despite dual medical therapy, and desires outlet procedure. We discussed options of TURP, PVP, REZUM, and other possible procedural interventions with pros and cons of each discussed. He is interested in proceeding with TURP.  Risks of bleeding, infection, pain, incontinence, and injury to other structures were discussed. Perioperative expectations were also discussed. He elects to proceed.    - Schedule for TURP    25 total minutes spent on the date of the encounter including direct interaction with the patient, performing chart review, documentation and further activities as noted above. This time spent counseling the patient regarding his elevated PSA was in addition to time spent performing cystoscopy, which was completed as workup for his BPH with urinary obstruction.    Marky Jarrett MD  Urology  [facility]

## 2021-08-27 NOTE — PATIENT INSTRUCTIONS
"AFTER YOUR CYSTOSCOPY  ?  ?  You have just completed a cystoscopy, or \"cysto\", which allowed your physician to learn more about your bladder (or to remove a stent placed after surgery). We suggest that you continue to avoid caffeine, fruit juice, and alcohol for the next 24 hours, however, you are encouraged to return to your normal activities.  ?  ?  A few things that are considered normal after your cystoscopy:  ?  * small amount of bleeding (or spotting) that clears within the next 24 hours  ?  * slight burning sensation with urination  ?  * sensation of needing to void (urinate) more frequently  ?  * the feeling of \"air\" in your urine  ?  * mild discomfort that is relieved with Tylenol    * bladder spasms  ?  ?  ?  Please contact our office promptly if you:  ?  * develop a fever above 101 degrees  ?  * are unable to urinate  ?  * develop bright red blood that does not stop  ?  * experience severe pain or swelling  ?  ?  ?  And of course, please contact our office with any concerns or questions [phone]  ?      AFTER YOUR CYSTOSCOPY        You have just completed a cystoscopy, or \"cysto\", which allowed your physician to learn more about your bladder (or to remove a stent placed after surgery). We suggest that you continue to avoid caffeine, fruit juice, and alcohol for the next 24 hours, however, you are encouraged to return to your normal activities.         A few things that are considered normal after your cystoscopy:     * Small amount of bleeding (or spotting) that clears within the next 24 hours     * Slight burning sensation with urination     * Sensation to of needing to avoid more frequently     * The feeling of \"air\" in your urine     * Mild discomfort that is relieved with Tylenol        Please contact our office promptly if you:     * Develop a fever above 101 degrees     * Are unable to urinate     * Develop bright red blood that does not stop     * Severe pain or swelling         Please contact " our office with any concerns or questions @[facility].

## 2021-08-27 NOTE — NURSING NOTE
Chief Complaint   Patient presents with     Elevated PSA     Here for a in office cystoscopy     Prior to the start of the procedure and with procedural staff participation, I verbally confirmed the patient s identity using two indicators, relevant allergies, that the procedure was appropriate and matched the consent or emergent situation, and that the correct equipment/implants were available. Immediately prior to starting the procedure I conducted the Time Out with the procedural staff and re-confirmed the patient s name, procedure, and site/side. I have wiped the patient off with the povidone-Iodine solution, draped them,  used Lidocaine hydrochloride jelly, and instilled sterile water into the bladder. (The Joint Commission universal protocol was followed.)  Yes    Sedation (Moderate or Deep): Urojet    5mL 2% lidocaine hydrochloride Urojet instilled into urethra.    NDC# [address]-4  Lot #: [id number]  Expiration Date:  7-22    Rebeka Castaneda

## 2021-09-06 DIAGNOSIS — Z11.59 ENCOUNTER FOR SCREENING FOR OTHER VIRAL DISEASES: ICD-10-CM

## 2021-09-13 ENCOUNTER — TRANSFERRED RECORDS (OUTPATIENT)
Dept: HEALTH INFORMATION MANAGEMENT | Facility: CLINIC | Age: 76
End: 2021-09-13

## 2021-09-13 LAB
CREATININE (EXTERNAL): 0.7 MG/DL (ref 0.7–1.18)
GFR ESTIMATED (EXTERNAL): 93 ML/MIN/1.73M2
GFR ESTIMATED (IF AFRICAN AMERICAN) (EXTERNAL): 108 ML/MIN/1.73M2
GLUCOSE (EXTERNAL): 91 MG/DL (ref 65–99)
POTASSIUM (EXTERNAL): 5.3 MMOL/L (ref 3.5–5.3)

## 2021-09-30 DIAGNOSIS — Z11.59 ENCOUNTER FOR SCREENING FOR OTHER VIRAL DISEASES: Primary | ICD-10-CM

## 2021-10-07 RX ORDER — ACETAMINOPHEN 500 MG
500 TABLET ORAL EVERY 6 HOURS PRN
COMMUNITY
End: 2021-10-08

## 2021-10-07 RX ORDER — MONTELUKAST SODIUM 10 MG/1
10 TABLET ORAL DAILY PRN
COMMUNITY

## 2021-10-07 RX ORDER — INDOMETHACIN 50 MG/1
50 CAPSULE ORAL 3 TIMES DAILY PRN
COMMUNITY
End: 2021-10-08

## 2021-10-08 ENCOUNTER — ANESTHESIA (OUTPATIENT)
Dept: SURGERY | Facility: CLINIC | Age: 76
End: 2021-10-08
Payer: MEDICARE

## 2021-10-08 ENCOUNTER — HOSPITAL ENCOUNTER (OUTPATIENT)
Facility: CLINIC | Age: 76
Discharge: HOME | End: 2021-10-09
Attending: UROLOGY | Admitting: UROLOGY
Payer: MEDICARE

## 2021-10-08 ENCOUNTER — ANESTHESIA EVENT (OUTPATIENT)
Dept: SURGERY | Facility: CLINIC | Age: 76
End: 2021-10-08
Payer: MEDICARE

## 2021-10-08 DIAGNOSIS — N13.8 BPH WITH URINARY OBSTRUCTION: Primary | ICD-10-CM

## 2021-10-08 DIAGNOSIS — N40.1 BPH WITH URINARY OBSTRUCTION: Primary | ICD-10-CM

## 2021-10-08 LAB
ABO/RH(D): NORMAL
ANTIBODY SCREEN: NEGATIVE
CREAT SERPL-MCNC: 0.68 MG/DL (ref 0.66–1.25)
GFR SERPL CREATININE-BSD FRML MDRD: >90 ML/MIN/1.73M2
SPECIMEN EXPIRATION DATE: NORMAL

## 2021-10-08 PROCEDURE — 88342 IMHCHEM/IMCYTCHM 1ST ANTB: CPT | Mod: TC | Performed by: UROLOGY

## 2021-10-08 PROCEDURE — 710N000009 HC RECOVERY PHASE 1, LEVEL 1, PER MIN: Performed by: UROLOGY

## 2021-10-08 PROCEDURE — 250N000013 HC RX MED GY IP 250 OP 250 PS 637: Performed by: UROLOGY

## 2021-10-08 PROCEDURE — 250N000011 HC RX IP 250 OP 636: Performed by: UROLOGY

## 2021-10-08 PROCEDURE — 360N000076 HC SURGERY LEVEL 3, PER MIN: Performed by: UROLOGY

## 2021-10-08 PROCEDURE — 999N000141 HC STATISTIC PRE-PROCEDURE NURSING ASSESSMENT: Performed by: UROLOGY

## 2021-10-08 PROCEDURE — 86901 BLOOD TYPING SEROLOGIC RH(D): CPT | Performed by: UROLOGY

## 2021-10-08 PROCEDURE — 250N000009 HC RX 250: Performed by: NURSE ANESTHETIST, CERTIFIED REGISTERED

## 2021-10-08 PROCEDURE — 258N000003 HC RX IP 258 OP 636: Performed by: NURSE ANESTHETIST, CERTIFIED REGISTERED

## 2021-10-08 PROCEDURE — 52601 PROSTATECTOMY (TURP): CPT | Performed by: UROLOGY

## 2021-10-08 PROCEDURE — 272N000001 HC OR GENERAL SUPPLY STERILE: Performed by: UROLOGY

## 2021-10-08 PROCEDURE — 250N000011 HC RX IP 250 OP 636: Performed by: ANESTHESIOLOGY

## 2021-10-08 PROCEDURE — 36415 COLL VENOUS BLD VENIPUNCTURE: CPT | Performed by: UROLOGY

## 2021-10-08 PROCEDURE — 250N000011 HC RX IP 250 OP 636: Performed by: NURSE ANESTHETIST, CERTIFIED REGISTERED

## 2021-10-08 PROCEDURE — 82565 ASSAY OF CREATININE: CPT | Performed by: UROLOGY

## 2021-10-08 PROCEDURE — 370N000017 HC ANESTHESIA TECHNICAL FEE, PER MIN: Performed by: UROLOGY

## 2021-10-08 PROCEDURE — 258N000003 HC RX IP 258 OP 636: Performed by: UROLOGY

## 2021-10-08 PROCEDURE — 250N000009 HC RX 250: Performed by: UROLOGY

## 2021-10-08 PROCEDURE — 258N000001 HC RX 258: Performed by: UROLOGY

## 2021-10-08 PROCEDURE — 258N000003 HC RX IP 258 OP 636: Performed by: ANESTHESIOLOGY

## 2021-10-08 PROCEDURE — 250N000025 HC SEVOFLURANE, PER MIN: Performed by: UROLOGY

## 2021-10-08 RX ORDER — FENTANYL CITRATE 50 UG/ML
50 INJECTION, SOLUTION INTRAMUSCULAR; INTRAVENOUS
Status: DISCONTINUED | OUTPATIENT
Start: 2021-10-08 | End: 2021-10-08 | Stop reason: HOSPADM

## 2021-10-08 RX ORDER — ONDANSETRON 2 MG/ML
4 INJECTION INTRAMUSCULAR; INTRAVENOUS EVERY 30 MIN PRN
Status: DISCONTINUED | OUTPATIENT
Start: 2021-10-08 | End: 2021-10-08 | Stop reason: HOSPADM

## 2021-10-08 RX ORDER — NITROGLYCERIN 0.4 MG/1
0.4 TABLET SUBLINGUAL EVERY 5 MIN PRN
Status: DISCONTINUED | OUTPATIENT
Start: 2021-10-08 | End: 2021-10-09 | Stop reason: HOSPADM

## 2021-10-08 RX ORDER — AMOXICILLIN 250 MG
1 CAPSULE ORAL 2 TIMES DAILY
Status: DISCONTINUED | OUTPATIENT
Start: 2021-10-08 | End: 2021-10-09 | Stop reason: HOSPADM

## 2021-10-08 RX ORDER — NALOXONE HYDROCHLORIDE 0.4 MG/ML
0.2 INJECTION, SOLUTION INTRAMUSCULAR; INTRAVENOUS; SUBCUTANEOUS
Status: DISCONTINUED | OUTPATIENT
Start: 2021-10-08 | End: 2021-10-09 | Stop reason: HOSPADM

## 2021-10-08 RX ORDER — ONDANSETRON 2 MG/ML
INJECTION INTRAMUSCULAR; INTRAVENOUS PRN
Status: DISCONTINUED | OUTPATIENT
Start: 2021-10-08 | End: 2021-10-08

## 2021-10-08 RX ORDER — FENTANYL CITRATE 50 UG/ML
50 INJECTION, SOLUTION INTRAMUSCULAR; INTRAVENOUS EVERY 5 MIN PRN
Status: DISCONTINUED | OUTPATIENT
Start: 2021-10-08 | End: 2021-10-08 | Stop reason: HOSPADM

## 2021-10-08 RX ORDER — SODIUM CHLORIDE, SODIUM LACTATE, POTASSIUM CHLORIDE, CALCIUM CHLORIDE 600; 310; 30; 20 MG/100ML; MG/100ML; MG/100ML; MG/100ML
INJECTION, SOLUTION INTRAVENOUS CONTINUOUS
Status: DISCONTINUED | OUTPATIENT
Start: 2021-10-08 | End: 2021-10-08 | Stop reason: HOSPADM

## 2021-10-08 RX ORDER — LABETALOL HYDROCHLORIDE 5 MG/ML
10 INJECTION, SOLUTION INTRAVENOUS
Status: DISCONTINUED | OUTPATIENT
Start: 2021-10-08 | End: 2021-10-08 | Stop reason: HOSPADM

## 2021-10-08 RX ORDER — OXYBUTYNIN CHLORIDE 5 MG/1
5 TABLET ORAL 3 TIMES DAILY PRN
Status: DISCONTINUED | OUTPATIENT
Start: 2021-10-08 | End: 2021-10-09 | Stop reason: HOSPADM

## 2021-10-08 RX ORDER — PROPOFOL 10 MG/ML
INJECTION, EMULSION INTRAVENOUS PRN
Status: DISCONTINUED | OUTPATIENT
Start: 2021-10-08 | End: 2021-10-08

## 2021-10-08 RX ORDER — HYDROMORPHONE HCL IN WATER/PF 6 MG/30 ML
0.2 PATIENT CONTROLLED ANALGESIA SYRINGE INTRAVENOUS EVERY 5 MIN PRN
Status: DISCONTINUED | OUTPATIENT
Start: 2021-10-08 | End: 2021-10-08 | Stop reason: HOSPADM

## 2021-10-08 RX ORDER — VIT A/VIT C/VIT E/ZINC/COPPER 2148-113
2 TABLET ORAL DAILY
COMMUNITY

## 2021-10-08 RX ORDER — FLUTICASONE PROPIONATE 50 MCG
2 SPRAY, SUSPENSION (ML) NASAL DAILY PRN
Status: DISCONTINUED | OUTPATIENT
Start: 2021-10-08 | End: 2021-10-09 | Stop reason: HOSPADM

## 2021-10-08 RX ORDER — EPHEDRINE SULFATE 50 MG/ML
INJECTION, SOLUTION INTRAMUSCULAR; INTRAVENOUS; SUBCUTANEOUS PRN
Status: DISCONTINUED | OUTPATIENT
Start: 2021-10-08 | End: 2021-10-08

## 2021-10-08 RX ORDER — LORATADINE 10 MG
1 TABLET ORAL EVERY MORNING
COMMUNITY

## 2021-10-08 RX ORDER — PHENYTOIN SODIUM 100 MG/1
200 CAPSULE, EXTENDED RELEASE ORAL 2 TIMES DAILY
Status: DISCONTINUED | OUTPATIENT
Start: 2021-10-08 | End: 2021-10-09 | Stop reason: HOSPADM

## 2021-10-08 RX ORDER — CALCIUM CARBONATE 500 MG/1
500 TABLET, CHEWABLE ORAL 4 TIMES DAILY PRN
Status: DISCONTINUED | OUTPATIENT
Start: 2021-10-08 | End: 2021-10-09 | Stop reason: HOSPADM

## 2021-10-08 RX ORDER — PROPOFOL 10 MG/ML
INJECTION, EMULSION INTRAVENOUS CONTINUOUS PRN
Status: DISCONTINUED | OUTPATIENT
Start: 2021-10-08 | End: 2021-10-08

## 2021-10-08 RX ORDER — FENTANYL CITRATE 50 UG/ML
INJECTION, SOLUTION INTRAMUSCULAR; INTRAVENOUS PRN
Status: DISCONTINUED | OUTPATIENT
Start: 2021-10-08 | End: 2021-10-08

## 2021-10-08 RX ORDER — ONDANSETRON 4 MG/1
4 TABLET, ORALLY DISINTEGRATING ORAL EVERY 6 HOURS PRN
Status: DISCONTINUED | OUTPATIENT
Start: 2021-10-08 | End: 2021-10-09 | Stop reason: HOSPADM

## 2021-10-08 RX ORDER — LIDOCAINE HYDROCHLORIDE 20 MG/ML
INJECTION, SOLUTION INFILTRATION; PERINEURAL PRN
Status: DISCONTINUED | OUTPATIENT
Start: 2021-10-08 | End: 2021-10-08

## 2021-10-08 RX ORDER — ATROPA BELLADONNA AND OPIUM 16.2; 3 MG/1; MG/1
SUPPOSITORY RECTAL PRN
Status: DISCONTINUED | OUTPATIENT
Start: 2021-10-08 | End: 2021-10-08 | Stop reason: HOSPADM

## 2021-10-08 RX ORDER — FAMOTIDINE 20 MG/1
20 TABLET, FILM COATED ORAL 2 TIMES DAILY
Status: DISCONTINUED | OUTPATIENT
Start: 2021-10-08 | End: 2021-10-09 | Stop reason: HOSPADM

## 2021-10-08 RX ORDER — MONTELUKAST SODIUM 10 MG/1
10 TABLET ORAL DAILY PRN
Status: DISCONTINUED | OUTPATIENT
Start: 2021-10-08 | End: 2021-10-09 | Stop reason: HOSPADM

## 2021-10-08 RX ORDER — CEFAZOLIN SODIUM 2 G/100ML
2 INJECTION, SOLUTION INTRAVENOUS SEE ADMIN INSTRUCTIONS
Status: DISCONTINUED | OUTPATIENT
Start: 2021-10-08 | End: 2021-10-08 | Stop reason: HOSPADM

## 2021-10-08 RX ORDER — EZETIMIBE 10 MG/1
10 TABLET ORAL AT BEDTIME
Status: DISCONTINUED | OUTPATIENT
Start: 2021-10-08 | End: 2021-10-09 | Stop reason: HOSPADM

## 2021-10-08 RX ORDER — OXYCODONE HYDROCHLORIDE 5 MG/1
5 TABLET ORAL EVERY 4 HOURS PRN
Status: DISCONTINUED | OUTPATIENT
Start: 2021-10-08 | End: 2021-10-08 | Stop reason: HOSPADM

## 2021-10-08 RX ORDER — ATROPA BELLADONNA AND OPIUM 16.2; 3 MG/1; MG/1
30 SUPPOSITORY RECTAL 3 TIMES DAILY PRN
Status: DISCONTINUED | OUTPATIENT
Start: 2021-10-08 | End: 2021-10-09 | Stop reason: HOSPADM

## 2021-10-08 RX ORDER — MEPERIDINE HYDROCHLORIDE 25 MG/ML
12.5 INJECTION INTRAMUSCULAR; INTRAVENOUS; SUBCUTANEOUS
Status: DISCONTINUED | OUTPATIENT
Start: 2021-10-08 | End: 2021-10-08 | Stop reason: HOSPADM

## 2021-10-08 RX ORDER — OXYCODONE HYDROCHLORIDE 5 MG/1
10 TABLET ORAL EVERY 4 HOURS PRN
Status: DISCONTINUED | OUTPATIENT
Start: 2021-10-08 | End: 2021-10-09 | Stop reason: HOSPADM

## 2021-10-08 RX ORDER — NALOXONE HYDROCHLORIDE 0.4 MG/ML
0.4 INJECTION, SOLUTION INTRAMUSCULAR; INTRAVENOUS; SUBCUTANEOUS
Status: DISCONTINUED | OUTPATIENT
Start: 2021-10-08 | End: 2021-10-09 | Stop reason: HOSPADM

## 2021-10-08 RX ORDER — PITAVASTATIN CALCIUM 1.04 MG/1
2 TABLET, FILM COATED ORAL EVERY EVENING
Status: DISCONTINUED | OUTPATIENT
Start: 2021-10-08 | End: 2021-10-09 | Stop reason: HOSPADM

## 2021-10-08 RX ORDER — LORATADINE 10 MG
2 TABLET ORAL EVERY EVENING
COMMUNITY

## 2021-10-08 RX ORDER — DEXAMETHASONE SODIUM PHOSPHATE 4 MG/ML
INJECTION, SOLUTION INTRA-ARTICULAR; INTRALESIONAL; INTRAMUSCULAR; INTRAVENOUS; SOFT TISSUE PRN
Status: DISCONTINUED | OUTPATIENT
Start: 2021-10-08 | End: 2021-10-08

## 2021-10-08 RX ORDER — OXYCODONE HYDROCHLORIDE 5 MG/1
5 TABLET ORAL EVERY 4 HOURS PRN
Status: DISCONTINUED | OUTPATIENT
Start: 2021-10-08 | End: 2021-10-09 | Stop reason: HOSPADM

## 2021-10-08 RX ORDER — CEFAZOLIN SODIUM 2 G/100ML
2 INJECTION, SOLUTION INTRAVENOUS
Status: DISCONTINUED | OUTPATIENT
Start: 2021-10-08 | End: 2021-10-08 | Stop reason: HOSPADM

## 2021-10-08 RX ORDER — ONDANSETRON 4 MG/1
4 TABLET, ORALLY DISINTEGRATING ORAL EVERY 30 MIN PRN
Status: DISCONTINUED | OUTPATIENT
Start: 2021-10-08 | End: 2021-10-08 | Stop reason: HOSPADM

## 2021-10-08 RX ORDER — SODIUM CHLORIDE, SODIUM LACTATE, POTASSIUM CHLORIDE, CALCIUM CHLORIDE 600; 310; 30; 20 MG/100ML; MG/100ML; MG/100ML; MG/100ML
INJECTION, SOLUTION INTRAVENOUS CONTINUOUS PRN
Status: DISCONTINUED | OUTPATIENT
Start: 2021-10-08 | End: 2021-10-08

## 2021-10-08 RX ORDER — ACETAMINOPHEN 325 MG/1
650 TABLET ORAL EVERY 6 HOURS
Status: DISCONTINUED | OUTPATIENT
Start: 2021-10-08 | End: 2021-10-09 | Stop reason: HOSPADM

## 2021-10-08 RX ORDER — LIDOCAINE 40 MG/G
CREAM TOPICAL
Status: DISCONTINUED | OUTPATIENT
Start: 2021-10-08 | End: 2021-10-09 | Stop reason: HOSPADM

## 2021-10-08 RX ORDER — NEOMYCIN/BACITRACIN/POLYMYXINB 3.5-400-5K
OINTMENT (GRAM) TOPICAL 4 TIMES DAILY PRN
Status: DISCONTINUED | OUTPATIENT
Start: 2021-10-08 | End: 2021-10-09 | Stop reason: HOSPADM

## 2021-10-08 RX ORDER — TAMSULOSIN HYDROCHLORIDE 0.4 MG/1
0.4 CAPSULE ORAL DAILY
Status: DISCONTINUED | OUTPATIENT
Start: 2021-10-08 | End: 2021-10-09 | Stop reason: HOSPADM

## 2021-10-08 RX ORDER — ONDANSETRON 2 MG/ML
4 INJECTION INTRAMUSCULAR; INTRAVENOUS EVERY 6 HOURS PRN
Status: DISCONTINUED | OUTPATIENT
Start: 2021-10-08 | End: 2021-10-09 | Stop reason: HOSPADM

## 2021-10-08 RX ORDER — GLYCINE 1.5 G/100ML
SOLUTION IRRIGATION PRN
Status: DISCONTINUED | OUTPATIENT
Start: 2021-10-08 | End: 2021-10-08 | Stop reason: HOSPADM

## 2021-10-08 RX ORDER — SODIUM CHLORIDE 9 MG/ML
INJECTION, SOLUTION INTRAVENOUS CONTINUOUS
Status: DISCONTINUED | OUTPATIENT
Start: 2021-10-08 | End: 2021-10-09 | Stop reason: HOSPADM

## 2021-10-08 RX ADMIN — ACETAMINOPHEN 650 MG: 325 TABLET, FILM COATED ORAL at 16:20

## 2021-10-08 RX ADMIN — SODIUM CHLORIDE, POTASSIUM CHLORIDE, SODIUM LACTATE AND CALCIUM CHLORIDE: 600; 310; 30; 20 INJECTION, SOLUTION INTRAVENOUS at 13:52

## 2021-10-08 RX ADMIN — TAMSULOSIN HYDROCHLORIDE 0.4 MG: 0.4 CAPSULE ORAL at 16:20

## 2021-10-08 RX ADMIN — FENTANYL CITRATE 50 MCG: 50 INJECTION, SOLUTION INTRAMUSCULAR; INTRAVENOUS at 11:54

## 2021-10-08 RX ADMIN — SENNOSIDES AND DOCUSATE SODIUM 1 TABLET: 8.6; 5 TABLET ORAL at 21:16

## 2021-10-08 RX ADMIN — SODIUM CHLORIDE 6000 ML: 900 IRRIGANT IRRIGATION at 18:56

## 2021-10-08 RX ADMIN — SODIUM CHLORIDE: 9 INJECTION, SOLUTION INTRAVENOUS at 16:20

## 2021-10-08 RX ADMIN — PROPOFOL 50 MCG/KG/MIN: 10 INJECTION, EMULSION INTRAVENOUS at 11:55

## 2021-10-08 RX ADMIN — FENTANYL CITRATE 50 MCG: 50 INJECTION, SOLUTION INTRAMUSCULAR; INTRAVENOUS at 11:51

## 2021-10-08 RX ADMIN — Medication 10 MG: at 12:01

## 2021-10-08 RX ADMIN — PHENYLEPHRINE HYDROCHLORIDE 50 MCG: 10 INJECTION INTRAVENOUS at 13:06

## 2021-10-08 RX ADMIN — PHENYLEPHRINE HYDROCHLORIDE 50 MCG: 10 INJECTION INTRAVENOUS at 13:02

## 2021-10-08 RX ADMIN — ACETAMINOPHEN 650 MG: 325 TABLET, FILM COATED ORAL at 22:53

## 2021-10-08 RX ADMIN — FAMOTIDINE 20 MG: 20 TABLET ORAL at 21:17

## 2021-10-08 RX ADMIN — FENTANYL CITRATE 50 MCG: 50 INJECTION, SOLUTION INTRAMUSCULAR; INTRAVENOUS at 13:51

## 2021-10-08 RX ADMIN — Medication 10 MG: at 12:07

## 2021-10-08 RX ADMIN — LIDOCAINE HYDROCHLORIDE 80 MG: 20 INJECTION, SOLUTION INFILTRATION; PERINEURAL at 11:55

## 2021-10-08 RX ADMIN — PHENYTOIN SODIUM 200 MG: 100 CAPSULE ORAL at 21:16

## 2021-10-08 RX ADMIN — PHENYLEPHRINE HYDROCHLORIDE 50 MCG: 10 INJECTION INTRAVENOUS at 12:08

## 2021-10-08 RX ADMIN — DEXAMETHASONE SODIUM PHOSPHATE 4 MG: 4 INJECTION, SOLUTION INTRA-ARTICULAR; INTRALESIONAL; INTRAMUSCULAR; INTRAVENOUS; SOFT TISSUE at 12:08

## 2021-10-08 RX ADMIN — ONDANSETRON 4 MG: 2 INJECTION INTRAMUSCULAR; INTRAVENOUS at 12:42

## 2021-10-08 RX ADMIN — PHENYLEPHRINE HYDROCHLORIDE 50 MCG: 10 INJECTION INTRAVENOUS at 12:22

## 2021-10-08 RX ADMIN — SODIUM CHLORIDE, POTASSIUM CHLORIDE, SODIUM LACTATE AND CALCIUM CHLORIDE: 600; 310; 30; 20 INJECTION, SOLUTION INTRAVENOUS at 11:49

## 2021-10-08 RX ADMIN — HYDROMORPHONE HYDROCHLORIDE 0.2 MG: 0.2 INJECTION, SOLUTION INTRAMUSCULAR; INTRAVENOUS; SUBCUTANEOUS at 14:12

## 2021-10-08 RX ADMIN — EZETIMIBE 10 MG: 10 TABLET ORAL at 21:17

## 2021-10-08 RX ADMIN — METOPROLOL TARTRATE 12.5 MG: 25 TABLET, FILM COATED ORAL at 21:16

## 2021-10-08 RX ADMIN — CEFAZOLIN SODIUM 2 G: 2 INJECTION, SOLUTION INTRAVENOUS at 11:55

## 2021-10-08 RX ADMIN — PROPOFOL 110 MG: 10 INJECTION, EMULSION INTRAVENOUS at 11:55

## 2021-10-08 RX ADMIN — Medication 5 MG: at 12:12

## 2021-10-08 ASSESSMENT — ENCOUNTER SYMPTOMS: SEIZURES: 1

## 2021-10-08 ASSESSMENT — MIFFLIN-ST. JEOR: SCORE: 1516

## 2021-10-08 NOTE — ANESTHESIA CARE TRANSFER NOTE
Patient: Kimani A Tupy    Procedure: Procedure(s):  CYSTOSCOPY, WITH TRANSURETHRAL RESECTION PROSTATE (Monopolar )       Diagnosis: BPH with urinary obstruction [N40.1, N13.8]  Diagnosis Additional Information: No value filed.    Anesthesia Type:   General     Note:    Oropharynx: oropharynx clear of all foreign objects  Level of Consciousness: awake  Oxygen Supplementation: face mask  Level of Supplemental Oxygen (L/min / FiO2): 6  Independent Airway: airway patency satisfactory and stable  Dentition: dentition unchanged  Vital Signs Stable: post-procedure vital signs reviewed and stable  Report to RN Given: handoff report given  Patient transferred to: PACU    Handoff Report: Identifed the Patient, Identified the Reponsible Provider, Reviewed the pertinent medical history, Discussed the surgical course, Reviewed Intra-OP anesthesia mangement and issues during anesthesia, Set expectations for post-procedure period and Allowed opportunity for questions and acknowledgement of understanding      Vitals:  Vitals Value Taken Time   /77 10/08/21 1322   Temp     Pulse 64 10/08/21 1325   Resp 10 10/08/21 1325   SpO2 100 % 10/08/21 1325   Vitals shown include unvalidated device data.    Electronically Signed By: SUSANA Choi CRNA  October 8, 2021  1:26 PM

## 2021-10-08 NOTE — ANESTHESIA PREPROCEDURE EVALUATION
Anesthesia Pre-Procedure Evaluation    Patient: Kimani Juarez   MRN: [id number] : 1945        Preoperative Diagnosis: BPH with urinary obstruction [N40.1, N13.8]    Procedure : Procedure(s):  CYSTOSCOPY, WITH TRANSURETHRAL RESECTION PROSTATE          Past Medical History:   Diagnosis Date     Anal stenosis      Arrhythmia     HX. ATRIAL FIBRILLATION     Coronary artery disease      Environmental and seasonal allergies      Hearing loss      Heartburn      History of anal fissures      History of atrial fibrillation      Hx: UTI (urinary tract infection)     WITH SEPSIS     Hyperlipidemia      Hypertension      Mumps      RBBB (right bundle branch block)      Seizure disorder (H)      Vitamin D deficiency       Past Surgical History:   Procedure Laterality Date     CARDIAC SURGERY      LEFT HEART CATH, RCA STENT PLACEMENT     COLONOSCOPY       CYSTOSCOPY       GI SURGERY       ORTHOPEDIC SURGERY      RIGHT CARPAL TUNNEL RELEASE     PROCTOPLASTY      FOR ANAL STENOSIS, WITH DEBRIDEMENT OF ANAL FISSURE     SOFT TISSUE SURGERY      EXCISION OF RIGHT BENIGN NECK MASS     VASECTOMY        Allergies   Allergen Reactions     Levofloxacin      LEFT ANKLE SWELLING     Pravastatin      Other reaction(s): Myalgia  Tolerated low dose.. Not high dose     Rosuvastatin      Other reaction(s): Arthralgia  significant joint swelling and aches      Social History     Tobacco Use     Smoking status: Former Smoker     Packs/day: 0.50     Types: Cigarettes     Quit date: 1975     Years since quittin.8     Smokeless tobacco: Never Used   Substance Use Topics     Alcohol use: No      Wt Readings from Last 1 Encounters:   21 79.4 kg (175 lb)        Anesthesia Evaluation            ROS/MED HX  ENT/Pulmonary:    (-) sleep apnea   Neurologic:     (+) seizures, last seizure: none in over twenty years;,     Cardiovascular: Comment: RBBB; bout of afib, possibly due to UTI sepsis;    (+) Dyslipidemia hypertension--CAD  --stent-2009. valvular problems/murmurs Bicuspid aortic valve;.     METS/Exercise Tolerance:     Hematologic:       Musculoskeletal:       GI/Hepatic:     (+) GERD, Asymptomatic on medication,     Renal/Genitourinary:       Endo:       Psychiatric/Substance Use:       Infectious Disease:       Malignancy:       Other:            Physical Exam    Airway        Mallampati: II   TM distance: > 3 FB   Neck ROM: full   Mouth opening: > 3 cm    Respiratory Devices and Support         Dental  no notable dental history         Cardiovascular   cardiovascular exam normal          Pulmonary   pulmonary exam normal                OUTSIDE LABS:  CBC: No results found for: WBC, HGB, HCT, PLT  BMP: No results found for: NA, POTASSIUM, CHLORIDE, CO2, BUN, CR, GLC  COAGS: No results found for: PTT, INR, FIBR  POC: No results found for: BGM, HCG, HCGS  HEPATIC: No results found for: ALBUMIN, PROTTOTAL, ALT, AST, GGT, ALKPHOS, BILITOTAL, BILIDIRECT, CODEY  OTHER: No results found for: PH, LACT, A1C, GLADIS, PHOS, MAG, LIPASE, AMYLASE, TSH, T4, T3, CRP, SED    Anesthesia Plan    ASA Status:  3   NPO Status:  NPO Appropriate    Anesthesia Type: General.     - Airway: LMA   Induction: Intravenous.   Maintenance: Balanced.        Consents    Anesthesia Plan(s) and associated risks, benefits, and realistic alternatives discussed. Questions answered and patient/representative(s) expressed understanding.     - Discussed with:  Patient         Postoperative Care    Pain management: IV analgesics.   PONV prophylaxis: Ondansetron (or other 5HT-3), Dexamethasone or Solumedrol     Comments:                JOSEPH MESSINA MD

## 2021-10-08 NOTE — ANESTHESIA POSTPROCEDURE EVALUATION
Patient: Kimani A Tupy    Procedure: Procedure(s):  CYSTOSCOPY, WITH TRANSURETHRAL RESECTION PROSTATE (Monopolar )       Diagnosis:BPH with urinary obstruction [N40.1, N13.8]  Diagnosis Additional Information: No value filed.    Anesthesia Type:  General    Note:  Disposition: Outpatient   Postop Pain Control: Uneventful            Sign Out: Well controlled pain   PONV: No   Neuro/Psych: Uneventful            Sign Out: Acceptable/Baseline neuro status   Airway/Respiratory: Uneventful            Sign Out: Acceptable/Baseline resp. status   CV/Hemodynamics: Uneventful            Sign Out: Acceptable CV status; No obvious hypovolemia; No obvious fluid overload   Other NRE: NONE   DID A NON-ROUTINE EVENT OCCUR? No           Last vitals:  Vitals Value Taken Time   /77 10/08/21 1500   Temp 36.5  C (97.7  F) 10/08/21 1322   Pulse 62 10/08/21 1501   Resp 14 10/08/21 1501   SpO2 94 % 10/08/21 1503   Vitals shown include unvalidated device data.    Electronically Signed By: JOSEPH MESSINA MD  October 8, 2021  3:27 PM

## 2021-10-08 NOTE — PROGRESS NOTES
PTA medications completed by Medication Scribe day of surgery     Medication history sources: Patient, Surescripts and H&P  In the past week, patient estimated taking medication this percent of the time: Greater than 90%  Adherence assessment: N/A Not Observed    Significant changes made to the medication list:  None      Additional medication history information:   None    Medication reconciliation completed by provider prior to medication history? No    Time spent in this activity: 45 MINUTES    The information provided in this note is only as accurate as the sources available at the time of update(s)      Prior to Admission medications    Medication Sig Last Dose Taking? Auth Provider   aspirin EC 81 MG EC tablet Take 81 mg by mouth every other day  More than a week at AM Yes Reported, Patient   calcium carbonate (CALCIUM CARBONATE) 600 MG tablet Take 600 mg by mouth daily  10/7/2021 at AM Yes Reported, Patient   coenzyme Q-10 200 MG CAPS Take 200 mg by mouth daily  10/7/2021 at AM Yes Reported, Patient   ezetimibe (ZETIA) 10 MG tablet Take 10 mg by mouth 10/7/2021 at AM Yes Reported, Patient   famotidine (PEPCID) 20 MG tablet Take 20 mg by mouth 2 times daily  10/7/2021 at PM Yes Reported, Patient   Fiber Select Gummies CHEW Take 1 chew tab by mouth every morning 10/7/2021 at AM Yes Reported, Patient   Fiber Select Gummies CHEW Take 2 chew tab by mouth every evening 10/7/2021 at PM Yes Reported, Patient   finasteride (PROSCAR) 5 MG tablet Take 1 tablet (5 mg) by mouth daily 10/7/2021 at AM Yes Marky Jarrett MD   fish oil-omega-3 fatty acids 1000 MG capsule Take 1 g by mouth daily  More than a week at AM Yes Reported, Patient   metoprolol (LOPRESSOR) 25 MG tablet Take 12.5 mg by mouth 2 times daily (25MG X 0.5 = 12.5MG) 10/7/2021 at PM Yes Reported, Patient   montelukast (SINGULAIR) 10 MG tablet Take 10 mg by mouth daily as needed  10/6/2021 at PM Yes Reported, Patient   Multiple Vitamin  (MULTI-VITAMINS) TABS Take 1 tablet by mouth daily  10/7/2021 at AM Yes Reported, Patient   Multiple Vitamins-Minerals (PRESERVISION AREDS) TABS Take 2 tablets by mouth daily 10/7/2021 at PM Yes Reported, Patient   nitroGLYcerin (NITROSTAT) 0.4 MG sublingual tablet Place 0.4 mg under the tongue ON HAND at PRN Yes Reported, Patient   phenytoin (DILANTIN) 100 MG CR capsule Take 200 mg by mouth 2 times daily (100MG X 2 = 200MG) 10/7/2021 at PM Yes Reported, Patient   pitavastatin ( LIVALO) 2 MG TABS tablet Take 2 mg by mouth daily  More than a week at PM Yes Reported, Patient   tamsulosin (FLOMAX) 0.4 MG capsule Take 1 capsule (0.4 mg) by mouth daily 10/7/2021 at AM Yes Marky Jarrett MD   triamcinolone (NASACORT) 55 MCG/ACT Inhaler Spray 1 spray into both nostrils daily as needed  10/6/2021 at PM Yes Reported, Patient

## 2021-10-08 NOTE — PLAN OF CARE
Pt arrived from PACU around 1500. Denies pain. BS hypoactive, not passing flatus. On room air, capno discontinued. Tolerating low fiber diet. Ambulated x 1 with SBA. CBI going at moderate rate, pink tinged output, wean as able. Possible.discharge tomorrow

## 2021-10-08 NOTE — OP NOTE
DATE OF SERVICE: 10/8/2021  PREOPERATIVE DIAGNOSIS: BPH with urinary obstruction  POSTOPERATIVE DIAGNOSIS: BPH with urinary obstruction  PROCEDURE PERFORMED: Cystoscopy with transurethral resection of prostate.  ATTENDING SURGEON: Marky Jarrett MD      FINDINGS: Obstructing lateral and median lobes of prostate resected. Wide open channel from verumontanum to bladder neck.  ANESTHESIA: General.   ESTIMATED BLOOD LOSS: 25 mL.   SPECIMENS:   ID Type Source Tests Collected by Time Destination   1 : PROSTATE TISSUE Tissue Prostate SURGICAL PATHOLOGY EXAM Marky Jarrett MD 10/8/2021 12:30 PM      DRAINS: A 22-[place] 3-way catheter.     INDICATIONS FOR PROCEDURE: Omari Juarez is a 76 year old male who was seen in consultation for BPH with urinary obstruction. He has bothersome obstructive urinary symptoms despite maximal medical therapy.  After discussion of the risks, benefits and alternatives of the procedure, the patient agreed to proceed with transurethral resection of his prostate.  DESCRIPTION OF PROCEDURE: After verifying informed consent, the patient was taken to the operating room. Adequate anesthesia was induced, he was repositioned in dorsal lithotomy position and was prepped and draped in standard sterile fashion. A timeout was performed to verify correct patient and procedure. Pneumoboots and perioperative antibiotics were administered before the procedure commenced.   A 26 Fr resectoscope with a visual obturator was inserted. Upon gaining entrance into the bladder, a complete survey was performed. This was significant for moderate trabeculation with scattered cellules.  Both ureteral orifices were well visualized. There was a moderate median lobe. The prostate was 4 cm with significant lateral lobe hypertrophy.  Monopolar resecting loop was inserted and I began by resecting the left lateral lobe of the prostate. Tissue was resected from the right and left lobes, from the neck of the bladder down  to the verumontanum from the 2 o'clock position to 10 o'clock position. Anterior tissue was largely non-obstructing and left mostly intact. Once resection had been completed the Ellik evacuator was used to remove the prostate chips. I then reintroduced the resectoscope to ensure meticulous hemostasis of the prostatic fossa. The ureteral orifices were both visualized and noted to be uninjured prior to concluding the procedure, and the prostatic fossa appeared to be visually non-obstructing.  A 22-Maori 3-way catheter was placed, and 30 mL was instilled into the balloon. Catheter was connected up to continuous irrigation which was clear; the patient was awakened from anesthesia. He was transferred to the recovery room in stable condition.   POSTOPERATIVE PLAN: Will plan to observe him overnight on continuous bladder irrigation. Plan for discharge home with [place] catheter and trial of void next week.    Marky Jarrett MD  Urology  [facility]

## 2021-10-08 NOTE — ANESTHESIA PROCEDURE NOTES
Airway       Patient location during procedure: OR       Procedure Start/Stop Times: 10/8/2021 11:57 AM  Staff -        Anesthesiologist:  Balwinder Ling MD       CRNA: Grisel Granger APRN CRNA       Performed By: CRNA  Consent for Airway        Urgency: elective  Indications and Patient Condition       Indications for airway management: sundeep-procedural         Mask difficulty assessment: 0 - not attempted    Final Airway Details       Final airway type: supraglottic airway    Supraglottic Airway Details        Type: LMA       Brand: LMA Unique       LMA size: 5    Post intubation assessment        Placement verified by: capnometry, equal breath sounds and chest rise        Number of attempts at approach: 1       Number of other approaches attempted: 0       Secured with: paper tape       Ease of procedure: easy       Dentition: Unchanged

## 2021-10-09 VITALS
RESPIRATION RATE: 16 BRPM | HEIGHT: 70 IN | TEMPERATURE: 97.8 F | SYSTOLIC BLOOD PRESSURE: 114 MMHG | DIASTOLIC BLOOD PRESSURE: 66 MMHG | OXYGEN SATURATION: 94 % | BODY MASS INDEX: 24.41 KG/M2 | HEART RATE: 71 BPM | WEIGHT: 170.5 LBS

## 2021-10-09 LAB
ANION GAP SERPL CALCULATED.3IONS-SCNC: 5 MMOL/L (ref 3–14)
BUN SERPL-MCNC: 17 MG/DL (ref 7–30)
CALCIUM SERPL-MCNC: 8.1 MG/DL (ref 8.5–10.1)
CHLORIDE BLD-SCNC: 108 MMOL/L (ref 94–109)
CO2 SERPL-SCNC: 24 MMOL/L (ref 20–32)
CREAT SERPL-MCNC: 0.69 MG/DL (ref 0.66–1.25)
GFR SERPL CREATININE-BSD FRML MDRD: >90 ML/MIN/1.73M2
GLUCOSE BLD-MCNC: 95 MG/DL (ref 70–99)
HGB BLD-MCNC: 12.6 G/DL (ref 13.3–17.7)
POTASSIUM BLD-SCNC: 4 MMOL/L (ref 3.4–5.3)
SODIUM SERPL-SCNC: 137 MMOL/L (ref 133–144)

## 2021-10-09 PROCEDURE — 258N000003 HC RX IP 258 OP 636: Performed by: UROLOGY

## 2021-10-09 PROCEDURE — 36415 COLL VENOUS BLD VENIPUNCTURE: CPT | Performed by: UROLOGY

## 2021-10-09 PROCEDURE — 80048 BASIC METABOLIC PNL TOTAL CA: CPT | Performed by: UROLOGY

## 2021-10-09 PROCEDURE — 85018 HEMOGLOBIN: CPT | Performed by: UROLOGY

## 2021-10-09 PROCEDURE — 250N000013 HC RX MED GY IP 250 OP 250 PS 637: Performed by: UROLOGY

## 2021-10-09 RX ORDER — OXYBUTYNIN CHLORIDE 5 MG/1
5 TABLET ORAL 3 TIMES DAILY PRN
Qty: 5 TABLET | Refills: 0 | Status: SHIPPED | OUTPATIENT
Start: 2021-10-09 | End: 2021-11-18

## 2021-10-09 RX ORDER — AMOXICILLIN 250 MG
1 CAPSULE ORAL 2 TIMES DAILY PRN
Qty: 20 TABLET | Refills: 0 | Status: SHIPPED | OUTPATIENT
Start: 2021-10-09

## 2021-10-09 RX ADMIN — ACETAMINOPHEN 650 MG: 325 TABLET, FILM COATED ORAL at 10:49

## 2021-10-09 RX ADMIN — METOPROLOL TARTRATE 12.5 MG: 25 TABLET, FILM COATED ORAL at 08:10

## 2021-10-09 RX ADMIN — FAMOTIDINE 20 MG: 20 TABLET ORAL at 08:10

## 2021-10-09 RX ADMIN — PHENYTOIN SODIUM 200 MG: 100 CAPSULE ORAL at 08:09

## 2021-10-09 RX ADMIN — TAMSULOSIN HYDROCHLORIDE 0.4 MG: 0.4 CAPSULE ORAL at 08:10

## 2021-10-09 RX ADMIN — SODIUM CHLORIDE: 9 INJECTION, SOLUTION INTRAVENOUS at 02:22

## 2021-10-09 RX ADMIN — SENNOSIDES AND DOCUSATE SODIUM 1 TABLET: 8.6; 5 TABLET ORAL at 08:10

## 2021-10-09 RX ADMIN — ACETAMINOPHEN 650 MG: 325 TABLET, FILM COATED ORAL at 04:03

## 2021-10-09 ASSESSMENT — MIFFLIN-ST. JEOR: SCORE: 1519.63

## 2021-10-09 NOTE — PROGRESS NOTES
[facility] Progress Note          Assessment and Plan:     Principal Problem:    BPH with urinary obstruction    Assessment: POD #1 s/p TURP with Dr. Jarrett    Plan:   - Doing well  - discharge to home today with tamayo in place      Anthony Rosado MD   [facility]  Office: [phone]               Interval History:     doing well; no cp, sob, n/v/d, or abd pain. Tamayo clear off CBI              Review of Systems:     The 5 point Review of Systems is negative other than noted in the HPI             Medications:     Current Facility-Administered Medications Ordered in Epic   Medication Dose Route Frequency Last Rate Last Admin     acetaminophen (TYLENOL) tablet 650 mg  650 mg Oral Q6H   650 mg at 10/09/21 0403     calcium carbonate (TUMS) chewable tablet 500 mg  500 mg Oral 4x Daily PRN         ezetimibe (ZETIA) tablet 10 mg  10 mg Oral At Bedtime   10 mg at 10/08/21 2117     famotidine (PEPCID) tablet 20 mg  20 mg Oral BID   20 mg at 10/09/21 0810     fluticasone (FLONASE) 50 MCG/ACT spray 2 spray  2 spray Both Nostrils Daily PRN         ibuprofen (ADVIL/MOTRIN) tablet 600 mg  600 mg Oral Q6H PRN         lidocaine (LMX4) cream   Topical Q1H PRN         lidocaine 1 % 0.1-1 mL  0.1-1 mL Other Q1H PRN         metoprolol tartrate (LOPRESSOR) half-tab 12.5 mg  12.5 mg Oral BID   12.5 mg at 10/09/21 0810     montelukast (SINGULAIR) tablet 10 mg  10 mg Oral Daily PRN         naloxone (NARCAN) injection 0.2 mg  0.2 mg Intravenous Q2 Min PRN        Or     naloxone (NARCAN) injection 0.4 mg  0.4 mg Intravenous Q2 Min PRN        Or     naloxone (NARCAN) injection 0.2 mg  0.2 mg Intramuscular Q2 Min PRN        Or     naloxone (NARCAN) injection 0.4 mg  0.4 mg Intramuscular Q2 Min PRN         neomycin-bacitracin-polymyxin (NEOSPORIN) ointment   Topical 4x Daily PRN         nitroGLYcerin (NITROSTAT) sublingual tablet 0.4 mg  0.4 mg Sublingual Q5 Min PRN         ondansetron (ZOFRAN-ODT) ODT tab 4 mg   4 mg Oral Q6H PRN        Or     ondansetron (ZOFRAN) injection 4 mg  4 mg Intravenous Q6H PRN         opium-belladonna (B&O SUPPRETTES) 30-16.2 MG per suppository 1 suppository  30 mg Rectal TID PRN         oxybutynin (DITROPAN) tablet 5 mg  5 mg Oral TID PRN         oxyCODONE (ROXICODONE) tablet 5 mg  5 mg Oral Q4H PRN        Or     oxyCODONE (ROXICODONE) tablet 10 mg  10 mg Oral Q4H PRN         phenytoin (DILANTIN) CR capsule 200 mg  200 mg Oral BID   200 mg at 10/09/21 0809     pitavastatin (LIVALO) tablet 2 mg  2 mg Oral QPM         senna-docusate (SENOKOT-S/PERICOLACE) 8.6-50 MG per tablet 1 tablet  1 tablet Oral BID   1 tablet at 10/09/21 0810     sodium chloride (PF) 0.9% PF flush 3 mL  3 mL Intracatheter Q8H         sodium chloride (PF) 0.9% PF flush 3 mL  3 mL Intracatheter q1 min prn         sodium chloride 0.9% (bag) irrigation   Irrigation Continuous   6,000 mL at 10/08/21 1856     sodium chloride 0.9% infusion   Intravenous Continuous 100 mL/hr at 10/09/21 0222 New Bag at 10/09/21 0222     tamsulosin (FLOMAX) capsule 0.4 mg  0.4 mg Oral Daily   0.4 mg at 10/09/21 0810     Current Outpatient Medications Ordered in Epic   Medication     oxybutynin (DITROPAN) 5 MG tablet     senna-docusate (SENOKOT-S/PERICOLACE) 8.6-50 MG tablet                  Physical Exam:   Vitals were reviewed  Patient Vitals for the past 8 hrs:   BP Temp Temp src Pulse Resp SpO2 Weight   10/09/21 0757 114/66 97.8  F (36.6  C) Oral 71 16 94 % --   10/09/21 0215 -- -- -- -- -- -- 77.3 kg (170 lb 8 oz)     GEN: NAD, lying in bed  HEENT: EOMI  NECK: Supple  ABD:  soft  EXT: No LE edema  : [place] clear with CBI clamped            Data:     Lab Results   Component Value Date    CR 0.68 10/08/2021     No results found for: WBC, HGB, HCT, MCV, PLT  No results found for: INR

## 2021-10-09 NOTE — PLAN OF CARE
Discharge Note    Patient discharged to home via private vehicle  accompanied by significant other .  IV: Discontinued  Prescriptions filled and given to patient.  Belongings reviewed and sent with patient.   Home medications returned to patient: NA  Equipment sent with: patient.   family verbalizes understanding of discharge instructions. AVS given to patient.

## 2021-10-09 NOTE — PLAN OF CARE
POD#1. Pt is A&O x4. VSS on RA. LS clear. Denies pain. BS hypoactive, denies passing flatus, no BM this shift. Up ind, ambulated halls x 2 times this shift, clots noted in tubing after ambulation. CBI running at a slow rate with clear output. C/o of discomfort in bladder, irrigated x1, no clots noted. Discomfort improves with movement. PIV infusing IVF. On low fiber diet. Initiated tamayo care teaching this AM.Plan for possible discharge to home today with tamayo.

## 2021-10-12 ENCOUNTER — ALLIED HEALTH/NURSE VISIT (OUTPATIENT)
Dept: UROLOGY | Facility: CLINIC | Age: 76
End: 2021-10-12
Payer: COMMERCIAL

## 2021-10-12 DIAGNOSIS — Z79.2 PROPHYLACTIC ANTIBIOTIC: Primary | ICD-10-CM

## 2021-10-12 LAB
PATH REPORT.COMMENTS IMP SPEC: NORMAL
PATH REPORT.COMMENTS IMP SPEC: NORMAL
PATH REPORT.FINAL DX SPEC: NORMAL
PATH REPORT.GROSS SPEC: NORMAL
PATH REPORT.MICROSCOPIC SPEC OTHER STN: NORMAL
PHOTO IMAGE: NORMAL

## 2021-10-12 PROCEDURE — 88305 TISSUE EXAM BY PATHOLOGIST: CPT | Mod: 26 | Performed by: PATHOLOGY

## 2021-10-12 PROCEDURE — 88344 IMHCHEM/IMCYTCHM EA MLT ANTB: CPT | Mod: 26 | Performed by: PATHOLOGY

## 2021-10-12 PROCEDURE — 99211 OFF/OP EST MAY X REQ PHY/QHP: CPT

## 2021-10-12 RX ORDER — NITROFURANTOIN 25; 75 MG/1; MG/1
100 CAPSULE ORAL ONCE
Qty: 1 CAPSULE | Refills: 0 | Status: SHIPPED | OUTPATIENT
Start: 2021-10-12 | End: 2021-10-12

## 2021-10-12 NOTE — PROGRESS NOTES
Kimani Juarez comes into clinic today at the request of Dr. Jarrett Ordering Provider for Catheter Removal.     Patient's catheter balloon was completely deflated and catheter was removed with ease. No complaints voiced by patient. He will return to clinic with MD for follow-up. He will take one antibiotic today that was sent to his pharmacy.     This service provided today was under the supervising provider of the day Dr. Lewis, who was available if needed.    Caitlin Arambula LPN

## 2021-11-18 ENCOUNTER — OFFICE VISIT (OUTPATIENT)
Dept: UROLOGY | Facility: CLINIC | Age: 76
End: 2021-11-18
Payer: COMMERCIAL

## 2021-11-18 VITALS
WEIGHT: 175 LBS | DIASTOLIC BLOOD PRESSURE: 70 MMHG | BODY MASS INDEX: 25.05 KG/M2 | HEIGHT: 70 IN | SYSTOLIC BLOOD PRESSURE: 130 MMHG

## 2021-11-18 DIAGNOSIS — N40.1 BPH WITH URINARY OBSTRUCTION: ICD-10-CM

## 2021-11-18 DIAGNOSIS — N13.8 BPH WITH URINARY OBSTRUCTION: ICD-10-CM

## 2021-11-18 DIAGNOSIS — R97.20 ELEVATED PROSTATE SPECIFIC ANTIGEN (PSA): Primary | ICD-10-CM

## 2021-11-18 LAB
ALBUMIN UR-MCNC: 100 MG/DL
APPEARANCE UR: CLEAR
BILIRUB UR QL STRIP: NEGATIVE
COLOR UR AUTO: YELLOW
GLUCOSE UR STRIP-MCNC: NEGATIVE MG/DL
HGB UR QL STRIP: ABNORMAL
KETONES UR STRIP-MCNC: 15 MG/DL
LEUKOCYTE ESTERASE UR QL STRIP: ABNORMAL
NITRATE UR QL: NEGATIVE
PH UR STRIP: 5.5 [PH] (ref 5–7)
SP GR UR STRIP: >=1.03 (ref 1–1.03)
UROBILINOGEN UR STRIP-ACNC: 0.2 E.U./DL

## 2021-11-18 PROCEDURE — 99024 POSTOP FOLLOW-UP VISIT: CPT | Performed by: UROLOGY

## 2021-11-18 PROCEDURE — 81003 URINALYSIS AUTO W/O SCOPE: CPT | Mod: QW | Performed by: UROLOGY

## 2021-11-18 ASSESSMENT — PAIN SCALES - GENERAL: PAINLEVEL: NO PAIN (0)

## 2021-11-18 ASSESSMENT — MIFFLIN-ST. JEOR: SCORE: 1540.04

## 2024-04-30 ENCOUNTER — TRANSFERRED RECORDS (OUTPATIENT)
Dept: HEALTH INFORMATION MANAGEMENT | Facility: CLINIC | Age: 79
End: 2024-04-30

## 2024-05-16 ENCOUNTER — MEDICAL CORRESPONDENCE (OUTPATIENT)
Dept: HEALTH INFORMATION MANAGEMENT | Facility: CLINIC | Age: 79
End: 2024-05-16

## 2024-08-28 ENCOUNTER — ANCILLARY PROCEDURE (OUTPATIENT)
Dept: PET IMAGING | Facility: CLINIC | Age: 79
End: 2024-08-28
Attending: PSYCHIATRY & NEUROLOGY
Payer: COMMERCIAL

## 2024-08-28 DIAGNOSIS — F03.A0 MILD DEMENTIA (H): ICD-10-CM

## 2024-08-28 DIAGNOSIS — F68.8 PERSONALITY CHANGE IN ADULT: ICD-10-CM

## 2024-08-28 DIAGNOSIS — R41.3 MEMORY IMPAIRMENT: ICD-10-CM

## 2024-08-28 DIAGNOSIS — R47.89 WORD FINDING DIFFICULTY: ICD-10-CM

## 2025-05-12 ENCOUNTER — APPOINTMENT (OUTPATIENT)
Age: 80
Setting detail: DERMATOLOGY
End: 2025-05-12

## 2025-05-12 DIAGNOSIS — D22 MELANOCYTIC NEVI: ICD-10-CM

## 2025-05-12 DIAGNOSIS — L57.0 ACTINIC KERATOSIS: ICD-10-CM

## 2025-05-12 DIAGNOSIS — Z71.89 OTHER SPECIFIED COUNSELING: ICD-10-CM

## 2025-05-12 DIAGNOSIS — D18.0 HEMANGIOMA: ICD-10-CM

## 2025-05-12 DIAGNOSIS — L82.1 OTHER SEBORRHEIC KERATOSIS: ICD-10-CM

## 2025-05-12 PROBLEM — D22.5 MELANOCYTIC NEVI OF TRUNK: Status: ACTIVE | Noted: 2025-05-12

## 2025-05-12 PROBLEM — D18.01 HEMANGIOMA OF SKIN AND SUBCUTANEOUS TISSUE: Status: ACTIVE | Noted: 2025-05-12

## 2025-05-12 PROCEDURE — ? LIQUID NITROGEN

## 2025-05-12 PROCEDURE — 17000 DESTRUCT PREMALG LESION: CPT

## 2025-05-12 PROCEDURE — 17003 DESTRUCT PREMALG LES 2-14: CPT

## 2025-05-12 PROCEDURE — ? COUNSELING

## 2025-05-12 PROCEDURE — 99213 OFFICE O/P EST LOW 20 MIN: CPT | Mod: 25

## 2025-05-12 PROCEDURE — ? SUNSCREEN RECOMMENDATIONS

## 2025-05-12 ASSESSMENT — LOCATION DETAILED DESCRIPTION DERM
LOCATION DETAILED: EPIGASTRIC SKIN
LOCATION DETAILED: SUPERIOR THORACIC SPINE
LOCATION DETAILED: LEFT SUPERIOR MEDIAL MIDBACK
LOCATION DETAILED: RIGHT MEDIAL TEMPLE
LOCATION DETAILED: LEFT INFERIOR MEDIAL FOREHEAD
LOCATION DETAILED: MIDDLE STERNUM
LOCATION DETAILED: INFERIOR THORACIC SPINE
LOCATION DETAILED: RIGHT CENTRAL TEMPLE
LOCATION DETAILED: RIGHT SUPERIOR MEDIAL MIDBACK

## 2025-05-12 ASSESSMENT — LOCATION SIMPLE DESCRIPTION DERM
LOCATION SIMPLE: CHEST
LOCATION SIMPLE: UPPER BACK
LOCATION SIMPLE: RIGHT LOWER BACK
LOCATION SIMPLE: ABDOMEN
LOCATION SIMPLE: LEFT LOWER BACK
LOCATION SIMPLE: RIGHT TEMPLE
LOCATION SIMPLE: LEFT FOREHEAD

## 2025-05-12 ASSESSMENT — LOCATION ZONE DERM
LOCATION ZONE: TRUNK
LOCATION ZONE: FACE

## 2025-05-12 NOTE — PROCEDURE: LIQUID NITROGEN
Render Post-Care Instructions In Note?: no
Detail Level: Detailed
Duration Of Freeze Thaw-Cycle (Seconds): 8
Post-Care Instructions: I reviewed with the patient in detail post-care instructions. Patient is to wear sunprotection, and avoid picking at any of the treated lesions. Pt may apply Vaseline to crusted or scabbing areas.
Show Applicator Variable?: Yes
Consent: The patient's consent was obtained including but not limited to risks of crusting, scabbing, blistering, scarring, darker or lighter pigmentary change, recurrence, incomplete removal and infection.

## (undated) DEVICE — GLOVE PROTEXIS W/NEU-THERA 7.5  2D73TE75

## (undated) DEVICE — CATH FOLEY 3WAY 22FR 30ML LATEX 0167SI22

## (undated) DEVICE — DRAPE LITHO LINGMAN W/POUCH 3" 1-0425

## (undated) DEVICE — PACK TUR CUSTOM SBA15RUFSE

## (undated) DEVICE — SOL NACL 0.9% IRRIG 3000ML BAG 2B7477

## (undated) DEVICE — SOL GLYCINE 1.5% 3000ML BAG 2B7317

## (undated) DEVICE — PAD CHUX UNDERPAD 23X24" 7136

## (undated) DEVICE — TUBING SUCTION 12"X1/4" N612

## (undated) DEVICE — EVACUATOR BLADDER UROVAC LATEX M0067301250

## (undated) DEVICE — BAG URINARY DRAIN 4000ML LF 153509

## (undated) DEVICE — SOL NACL 0.9% IRRIG 1000ML BOTTLE 2F7124

## (undated) DEVICE — ESU ELEC LOOP 24FR 20750G

## (undated) DEVICE — SOL WATER IRRIG 1000ML BOTTLE 2F7114

## (undated) RX ORDER — CEFAZOLIN SODIUM 2 G/100ML
INJECTION, SOLUTION INTRAVENOUS
Status: DISPENSED
Start: 2021-10-08

## (undated) RX ORDER — LIDOCAINE HYDROCHLORIDE 20 MG/ML
INJECTION, SOLUTION EPIDURAL; INFILTRATION; INTRACAUDAL; PERINEURAL
Status: DISPENSED
Start: 2021-10-08

## (undated) RX ORDER — ATROPA BELLADONNA AND OPIUM 16.2; 3 MG/1; MG/1
SUPPOSITORY RECTAL
Status: DISPENSED
Start: 2021-10-08

## (undated) RX ORDER — FENTANYL CITRATE 0.05 MG/ML
INJECTION, SOLUTION INTRAMUSCULAR; INTRAVENOUS
Status: DISPENSED
Start: 2021-10-08

## (undated) RX ORDER — CEFAZOLIN SODIUM 1 G/3ML
INJECTION, POWDER, FOR SOLUTION INTRAMUSCULAR; INTRAVENOUS
Status: DISPENSED
Start: 2021-10-08

## (undated) RX ORDER — HYDROMORPHONE HCL IN WATER/PF 6 MG/30 ML
PATIENT CONTROLLED ANALGESIA SYRINGE INTRAVENOUS
Status: DISPENSED
Start: 2021-10-08

## (undated) RX ORDER — FENTANYL CITRATE 50 UG/ML
INJECTION, SOLUTION INTRAMUSCULAR; INTRAVENOUS
Status: DISPENSED
Start: 2021-10-08